# Patient Record
Sex: FEMALE | Race: BLACK OR AFRICAN AMERICAN | NOT HISPANIC OR LATINO | Employment: OTHER | URBAN - METROPOLITAN AREA
[De-identification: names, ages, dates, MRNs, and addresses within clinical notes are randomized per-mention and may not be internally consistent; named-entity substitution may affect disease eponyms.]

---

## 2022-09-22 ENCOUNTER — IMMUNIZATION (OUTPATIENT)
Dept: FAMILY MEDICINE | Facility: CLINIC | Age: 87
End: 2022-09-22
Payer: MEDICAID

## 2022-09-22 DIAGNOSIS — Z23 NEED FOR VACCINATION: Primary | ICD-10-CM

## 2022-09-22 PROCEDURE — 0124A COVID-19, MRNA, LNP-S, BIVALENT BOOSTER, PF, 30 MCG/0.3 ML DOSE: ICD-10-PCS | Mod: CV19,S$GLB,, | Performed by: INTERNAL MEDICINE

## 2022-09-22 PROCEDURE — 91312 COVID-19, MRNA, LNP-S, BIVALENT BOOSTER, PF, 30 MCG/0.3 ML DOSE: ICD-10-PCS | Mod: S$GLB,,, | Performed by: INTERNAL MEDICINE

## 2022-09-22 PROCEDURE — 91312 COVID-19, MRNA, LNP-S, BIVALENT BOOSTER, PF, 30 MCG/0.3 ML DOSE: CPT | Mod: S$GLB,,, | Performed by: INTERNAL MEDICINE

## 2022-09-22 PROCEDURE — 0124A COVID-19, MRNA, LNP-S, BIVALENT BOOSTER, PF, 30 MCG/0.3 ML DOSE: CPT | Mod: CV19,S$GLB,, | Performed by: INTERNAL MEDICINE

## 2023-03-10 ENCOUNTER — HOSPITAL ENCOUNTER (INPATIENT)
Facility: HOSPITAL | Age: 88
LOS: 5 days | Discharge: HOME HEALTH | DRG: 280 | End: 2023-03-15
Attending: STUDENT IN AN ORGANIZED HEALTH CARE EDUCATION/TRAINING PROGRAM | Admitting: INTERNAL MEDICINE
Payer: MEDICARE

## 2023-03-10 DIAGNOSIS — I10 HYPERTENSION, UNSPECIFIED TYPE: Primary | ICD-10-CM

## 2023-03-10 DIAGNOSIS — R68.84 JAW PAIN: ICD-10-CM

## 2023-03-10 DIAGNOSIS — R07.9 CHEST PAIN: ICD-10-CM

## 2023-03-10 DIAGNOSIS — I21.4 NSTEMI (NON-ST ELEVATED MYOCARDIAL INFARCTION): ICD-10-CM

## 2023-03-10 LAB
ALBUMIN SERPL-MCNC: 4 G/DL (ref 3.4–4.8)
ALBUMIN/GLOB SERPL: 1.1 RATIO (ref 1.1–2)
ALP SERPL-CCNC: 51 UNIT/L (ref 40–150)
ALT SERPL-CCNC: 9 UNIT/L (ref 0–55)
AST SERPL-CCNC: 21 UNIT/L (ref 5–34)
BASOPHILS # BLD AUTO: 0.07 X10(3)/MCL (ref 0–0.2)
BASOPHILS NFR BLD AUTO: 1.1 %
BILIRUBIN DIRECT+TOT PNL SERPL-MCNC: 0.4 MG/DL
BNP BLD-MCNC: 695.8 PG/ML
BUN SERPL-MCNC: 20.1 MG/DL (ref 9.8–20.1)
CALCIUM SERPL-MCNC: 9.2 MG/DL (ref 8.4–10.2)
CHLORIDE SERPL-SCNC: 111 MMOL/L (ref 98–111)
CO2 SERPL-SCNC: 20 MMOL/L (ref 23–31)
CREAT SERPL-MCNC: 0.85 MG/DL (ref 0.55–1.02)
CRP SERPL-MCNC: 4.9 MG/L
EOSINOPHIL # BLD AUTO: 0.08 X10(3)/MCL (ref 0–0.9)
EOSINOPHIL NFR BLD AUTO: 1.2 %
ERYTHROCYTE [DISTWIDTH] IN BLOOD BY AUTOMATED COUNT: 16.4 % (ref 11.5–17)
ERYTHROCYTE [SEDIMENTATION RATE] IN BLOOD: 13 MM/HR (ref 0–20)
FERRITIN SERPL-MCNC: 5.75 NG/ML (ref 4.63–204)
GFR SERPLBLD CREATININE-BSD FMLA CKD-EPI: >60 MLS/MIN/1.73/M2
GLOBULIN SER-MCNC: 3.5 GM/DL (ref 2.4–3.5)
GLUCOSE SERPL-MCNC: 81 MG/DL (ref 75–121)
HCT VFR BLD AUTO: 26.8 % (ref 37–47)
HGB BLD-MCNC: 8.1 G/DL (ref 12–16)
IMM GRANULOCYTES # BLD AUTO: 0.01 X10(3)/MCL (ref 0–0.04)
IMM GRANULOCYTES NFR BLD AUTO: 0.2 %
IRON SATN MFR SERPL: 10 % (ref 20–50)
IRON SERPL-MCNC: 42 UG/DL (ref 50–170)
LYMPHOCYTES # BLD AUTO: 1.65 X10(3)/MCL (ref 0.6–4.6)
LYMPHOCYTES NFR BLD AUTO: 25 %
MCH RBC QN AUTO: 23.3 PG
MCHC RBC AUTO-ENTMCNC: 30.2 G/DL (ref 33–36)
MCV RBC AUTO: 77.2 FL (ref 80–94)
MONOCYTES # BLD AUTO: 0.84 X10(3)/MCL (ref 0.1–1.3)
MONOCYTES NFR BLD AUTO: 12.7 %
NEUTROPHILS # BLD AUTO: 3.95 X10(3)/MCL (ref 2.1–9.2)
NEUTROPHILS NFR BLD AUTO: 59.8 %
NRBC BLD AUTO-RTO: 0 %
PLATELET # BLD AUTO: 322 X10(3)/MCL (ref 130–400)
PMV BLD AUTO: 10.4 FL (ref 7.4–10.4)
POTASSIUM SERPL-SCNC: 4 MMOL/L (ref 3.5–5.1)
PROT SERPL-MCNC: 7.5 GM/DL (ref 5.8–7.6)
RBC # BLD AUTO: 3.47 X10(6)/MCL (ref 4.2–5.4)
SODIUM SERPL-SCNC: 141 MMOL/L (ref 132–146)
TIBC SERPL-MCNC: 361 UG/DL (ref 70–310)
TIBC SERPL-MCNC: 403 UG/DL (ref 250–450)
TRANSFERRIN SERPL-MCNC: 365 MG/DL
TROPONIN I SERPL-MCNC: 0.04 NG/ML (ref 0–0.04)
TROPONIN I SERPL-MCNC: 0.05 NG/ML (ref 0–0.04)
TROPONIN I SERPL-MCNC: 0.05 NG/ML (ref 0–0.04)
VIT B12 SERPL-MCNC: 201 PG/ML (ref 213–816)
WBC # SPEC AUTO: 6.6 X10(3)/MCL (ref 4.5–11.5)

## 2023-03-10 PROCEDURE — 63600175 PHARM REV CODE 636 W HCPCS

## 2023-03-10 PROCEDURE — 84484 ASSAY OF TROPONIN QUANT: CPT | Performed by: PHYSICIAN ASSISTANT

## 2023-03-10 PROCEDURE — 85651 RBC SED RATE NONAUTOMATED: CPT | Performed by: INTERNAL MEDICINE

## 2023-03-10 PROCEDURE — 25000003 PHARM REV CODE 250: Performed by: STUDENT IN AN ORGANIZED HEALTH CARE EDUCATION/TRAINING PROGRAM

## 2023-03-10 PROCEDURE — 85025 COMPLETE CBC W/AUTO DIFF WBC: CPT | Performed by: NURSE PRACTITIONER

## 2023-03-10 PROCEDURE — 82607 VITAMIN B-12: CPT | Performed by: INTERNAL MEDICINE

## 2023-03-10 PROCEDURE — 93010 EKG 12-LEAD: ICD-10-PCS | Mod: 76,,, | Performed by: INTERNAL MEDICINE

## 2023-03-10 PROCEDURE — 25000003 PHARM REV CODE 250: Performed by: INTERNAL MEDICINE

## 2023-03-10 PROCEDURE — 83880 ASSAY OF NATRIURETIC PEPTIDE: CPT | Performed by: INTERNAL MEDICINE

## 2023-03-10 PROCEDURE — 96375 TX/PRO/DX INJ NEW DRUG ADDON: CPT

## 2023-03-10 PROCEDURE — 99285 EMERGENCY DEPT VISIT HI MDM: CPT | Mod: 25

## 2023-03-10 PROCEDURE — 93005 ELECTROCARDIOGRAM TRACING: CPT

## 2023-03-10 PROCEDURE — 83550 IRON BINDING TEST: CPT | Performed by: INTERNAL MEDICINE

## 2023-03-10 PROCEDURE — 11000001 HC ACUTE MED/SURG PRIVATE ROOM

## 2023-03-10 PROCEDURE — 96374 THER/PROPH/DIAG INJ IV PUSH: CPT

## 2023-03-10 PROCEDURE — 82728 ASSAY OF FERRITIN: CPT | Performed by: INTERNAL MEDICINE

## 2023-03-10 PROCEDURE — 80053 COMPREHEN METABOLIC PANEL: CPT | Performed by: NURSE PRACTITIONER

## 2023-03-10 PROCEDURE — 84484 ASSAY OF TROPONIN QUANT: CPT | Performed by: NURSE PRACTITIONER

## 2023-03-10 PROCEDURE — 21400001 HC TELEMETRY ROOM

## 2023-03-10 PROCEDURE — 93010 ELECTROCARDIOGRAM REPORT: CPT | Mod: ,,, | Performed by: INTERNAL MEDICINE

## 2023-03-10 PROCEDURE — 25000242 PHARM REV CODE 250 ALT 637 W/ HCPCS: Performed by: STUDENT IN AN ORGANIZED HEALTH CARE EDUCATION/TRAINING PROGRAM

## 2023-03-10 PROCEDURE — 93010 ELECTROCARDIOGRAM REPORT: CPT | Mod: 76,,, | Performed by: INTERNAL MEDICINE

## 2023-03-10 PROCEDURE — 63600175 PHARM REV CODE 636 W HCPCS: Performed by: INTERNAL MEDICINE

## 2023-03-10 PROCEDURE — 86140 C-REACTIVE PROTEIN: CPT | Performed by: INTERNAL MEDICINE

## 2023-03-10 RX ORDER — TRAMADOL HYDROCHLORIDE 50 MG/1
50 TABLET ORAL EVERY 6 HOURS PRN
Status: DISCONTINUED | OUTPATIENT
Start: 2023-03-10 | End: 2023-03-15 | Stop reason: HOSPADM

## 2023-03-10 RX ORDER — ONDANSETRON 2 MG/ML
4 INJECTION INTRAMUSCULAR; INTRAVENOUS EVERY 4 HOURS PRN
Status: DISCONTINUED | OUTPATIENT
Start: 2023-03-10 | End: 2023-03-15 | Stop reason: HOSPADM

## 2023-03-10 RX ORDER — PROCHLORPERAZINE EDISYLATE 5 MG/ML
5 INJECTION INTRAMUSCULAR; INTRAVENOUS EVERY 6 HOURS PRN
Status: DISCONTINUED | OUTPATIENT
Start: 2023-03-10 | End: 2023-03-15 | Stop reason: HOSPADM

## 2023-03-10 RX ORDER — HYDRALAZINE HYDROCHLORIDE 20 MG/ML
INJECTION INTRAMUSCULAR; INTRAVENOUS
Status: COMPLETED
Start: 2023-03-10 | End: 2023-03-10

## 2023-03-10 RX ORDER — HYDROCHLOROTHIAZIDE 25 MG/1
25 TABLET ORAL
Status: ON HOLD | COMMUNITY
Start: 2023-02-16 | End: 2023-03-15 | Stop reason: HOSPADM

## 2023-03-10 RX ORDER — NITROGLYCERIN 0.4 MG/1
0.4 TABLET SUBLINGUAL
Status: COMPLETED | OUTPATIENT
Start: 2023-03-10 | End: 2023-03-10

## 2023-03-10 RX ORDER — ACETAMINOPHEN 500 MG
1000 TABLET ORAL EVERY 6 HOURS PRN
Status: DISCONTINUED | OUTPATIENT
Start: 2023-03-10 | End: 2023-03-15 | Stop reason: HOSPADM

## 2023-03-10 RX ORDER — MAG HYDROX/ALUMINUM HYD/SIMETH 200-200-20
30 SUSPENSION, ORAL (FINAL DOSE FORM) ORAL 4 TIMES DAILY PRN
Status: DISCONTINUED | OUTPATIENT
Start: 2023-03-10 | End: 2023-03-15 | Stop reason: HOSPADM

## 2023-03-10 RX ORDER — HYDRALAZINE HYDROCHLORIDE 20 MG/ML
10 INJECTION INTRAMUSCULAR; INTRAVENOUS
Status: COMPLETED | OUTPATIENT
Start: 2023-03-10 | End: 2023-03-10

## 2023-03-10 RX ORDER — TALC
6 POWDER (GRAM) TOPICAL NIGHTLY PRN
Status: DISCONTINUED | OUTPATIENT
Start: 2023-03-10 | End: 2023-03-15 | Stop reason: HOSPADM

## 2023-03-10 RX ORDER — ENOXAPARIN SODIUM 100 MG/ML
1 INJECTION SUBCUTANEOUS ONCE
Status: COMPLETED | OUTPATIENT
Start: 2023-03-10 | End: 2023-03-11

## 2023-03-10 RX ORDER — LABETALOL HYDROCHLORIDE 5 MG/ML
10 INJECTION, SOLUTION INTRAVENOUS EVERY 4 HOURS PRN
Status: DISCONTINUED | OUTPATIENT
Start: 2023-03-10 | End: 2023-03-15 | Stop reason: HOSPADM

## 2023-03-10 RX ORDER — ENOXAPARIN SODIUM 100 MG/ML
40 INJECTION SUBCUTANEOUS EVERY 24 HOURS
Status: DISCONTINUED | OUTPATIENT
Start: 2023-03-11 | End: 2023-03-10

## 2023-03-10 RX ORDER — NITROGLYCERIN 0.4 MG/1
0.4 TABLET SUBLINGUAL EVERY 5 MIN PRN
Status: DISCONTINUED | OUTPATIENT
Start: 2023-03-10 | End: 2023-03-15 | Stop reason: HOSPADM

## 2023-03-10 RX ORDER — SODIUM CHLORIDE 0.9 % (FLUSH) 0.9 %
10 SYRINGE (ML) INJECTION
Status: DISCONTINUED | OUTPATIENT
Start: 2023-03-10 | End: 2023-03-15 | Stop reason: HOSPADM

## 2023-03-10 RX ORDER — ACETAMINOPHEN 325 MG/1
650 TABLET ORAL EVERY 4 HOURS PRN
Status: DISCONTINUED | OUTPATIENT
Start: 2023-03-10 | End: 2023-03-15 | Stop reason: HOSPADM

## 2023-03-10 RX ORDER — AMOXICILLIN 250 MG
2 CAPSULE ORAL 2 TIMES DAILY PRN
Status: DISCONTINUED | OUTPATIENT
Start: 2023-03-10 | End: 2023-03-15 | Stop reason: HOSPADM

## 2023-03-10 RX ORDER — HYDRALAZINE HYDROCHLORIDE 20 MG/ML
20 INJECTION INTRAMUSCULAR; INTRAVENOUS EVERY 4 HOURS PRN
Status: DISCONTINUED | OUTPATIENT
Start: 2023-03-10 | End: 2023-03-15 | Stop reason: HOSPADM

## 2023-03-10 RX ORDER — VERAPAMIL HYDROCHLORIDE 240 MG/1
240 TABLET, FILM COATED, EXTENDED RELEASE ORAL
COMMUNITY
Start: 2023-03-02

## 2023-03-10 RX ORDER — CLONIDINE HYDROCHLORIDE 0.2 MG/1
0.2 TABLET ORAL 3 TIMES DAILY PRN
Status: DISCONTINUED | OUTPATIENT
Start: 2023-03-10 | End: 2023-03-15 | Stop reason: HOSPADM

## 2023-03-10 RX ORDER — MORPHINE SULFATE 4 MG/ML
1 INJECTION, SOLUTION INTRAMUSCULAR; INTRAVENOUS
Status: COMPLETED | OUTPATIENT
Start: 2023-03-10 | End: 2023-03-10

## 2023-03-10 RX ORDER — ASPIRIN 81 MG/1
81 TABLET ORAL DAILY
Status: DISCONTINUED | OUTPATIENT
Start: 2023-03-11 | End: 2023-03-15 | Stop reason: HOSPADM

## 2023-03-10 RX ORDER — SODIUM CHLORIDE 0.9 % (FLUSH) 0.9 %
10 SYRINGE (ML) INJECTION
Status: DISCONTINUED | OUTPATIENT
Start: 2023-03-10 | End: 2023-03-10

## 2023-03-10 RX ORDER — ASPIRIN 325 MG
325 TABLET, DELAYED RELEASE (ENTERIC COATED) ORAL
Status: COMPLETED | OUTPATIENT
Start: 2023-03-10 | End: 2023-03-10

## 2023-03-10 RX ORDER — SIMETHICONE 80 MG
1 TABLET,CHEWABLE ORAL 4 TIMES DAILY PRN
Status: DISCONTINUED | OUTPATIENT
Start: 2023-03-10 | End: 2023-03-15 | Stop reason: HOSPADM

## 2023-03-10 RX ORDER — PANTOPRAZOLE SODIUM 40 MG/1
40 TABLET, DELAYED RELEASE ORAL DAILY
Status: DISCONTINUED | OUTPATIENT
Start: 2023-03-11 | End: 2023-03-15 | Stop reason: HOSPADM

## 2023-03-10 RX ORDER — NAPROXEN SODIUM 220 MG/1
81 TABLET, FILM COATED ORAL DAILY
COMMUNITY

## 2023-03-10 RX ORDER — METOPROLOL SUCCINATE 25 MG/1
25 TABLET, EXTENDED RELEASE ORAL DAILY
Status: DISCONTINUED | OUTPATIENT
Start: 2023-03-10 | End: 2023-03-11

## 2023-03-10 RX ORDER — LANSOPRAZOLE 30 MG/1
30 CAPSULE, DELAYED RELEASE ORAL
COMMUNITY
Start: 2023-02-24

## 2023-03-10 RX ORDER — POLYETHYLENE GLYCOL 3350 17 G/17G
17 POWDER, FOR SOLUTION ORAL 2 TIMES DAILY PRN
Status: DISCONTINUED | OUTPATIENT
Start: 2023-03-10 | End: 2023-03-15 | Stop reason: HOSPADM

## 2023-03-10 RX ADMIN — METOPROLOL SUCCINATE 25 MG: 25 TABLET, EXTENDED RELEASE ORAL at 10:03

## 2023-03-10 RX ADMIN — NITROGLYCERIN 0.4 MG: 0.4 TABLET, ORALLY DISINTEGRATING SUBLINGUAL at 04:03

## 2023-03-10 RX ADMIN — ASPIRIN 325 MG: 325 TABLET, COATED ORAL at 04:03

## 2023-03-10 RX ADMIN — MORPHINE SULFATE 1 MG: 4 INJECTION INTRAVENOUS at 08:03

## 2023-03-10 RX ADMIN — HYDRALAZINE HYDROCHLORIDE 10 MG: 20 INJECTION INTRAMUSCULAR; INTRAVENOUS at 05:03

## 2023-03-10 RX ADMIN — TRAMADOL HYDROCHLORIDE 50 MG: 50 TABLET, COATED ORAL at 11:03

## 2023-03-10 RX ADMIN — Medication 6 MG: at 11:03

## 2023-03-10 RX ADMIN — HYDRALAZINE HYDROCHLORIDE 10 MG: 20 INJECTION INTRAMUSCULAR; INTRAVENOUS at 08:03

## 2023-03-10 NOTE — ED PROVIDER NOTES
"Encounter Date: 3/10/2023    SCRIBE #1 NOTE: I, Brunojorge Martinez, am scribing for, and in the presence of,  Zheng Singh MD. I have scribed the following portions of the note - the EKG reading. Other sections scribed: hpi, ros, pe.     History     Chief Complaint   Patient presents with    Arm Pain     C/O L side arm pain and jaw pain, worse at night. Intermittent for "a while." Reports starts at night and usually gone by morning.  Denies pain at this time. Denies injury /fall.     89 y/o female with history of HTN presenting to the ED complaining of chest pain onset yesterday. Patient states pain radiates to left arm and left jaw; states jaw and arm pain is consistent at night but resolves in the morning. Patient's daughter states jaw and arm pain has been intermittent for the last several months but only occurs during the night. Patient denies recent fall or leg swelling.     The history is provided by the patient and a relative (Daughter). No  was used.   Chest Pain  The current episode started yesterday. Duration of episode(s) is 1 day. Chest pain occurs constantly. The quality of the pain is described as aching and crushing. The pain radiates to the upper back, left jaw and left arm. Pertinent negatives for primary symptoms include no fever, no shortness of breath and no abdominal pain.   Pertinent negatives for associated symptoms include no weakness.         Review of patient's allergies indicates:  No Known Allergies  History reviewed. No pertinent past medical history.  Past Surgical History:   Procedure Laterality Date    EGD, WITH CLOSED BIOPSY  3/13/2023    Procedure: EGD, WITH CLOSED BIOPSY;  Surgeon: Anuel Calix MD;  Location: [facility] ENDOSCOPY;  Service: Gastroenterology;;    ESOPHAGOGASTRODUODENOSCOPY N/A 3/13/2023    Procedure: EGD;  Surgeon: Anuel Calix MD;  Location: [facility] ENDOSCOPY;  Service: Gastroenterology;  Laterality: N/A;     History reviewed. No " pertinent family history.     Review of Systems   Constitutional:  Negative for fever.   HENT:  Negative for sore throat.         Left jaw pain   Eyes:  Negative for visual disturbance.   Respiratory:  Negative for shortness of breath.    Cardiovascular:  Positive for chest pain.   Gastrointestinal:  Negative for abdominal pain.   Genitourinary:  Negative for dysuria.   Musculoskeletal:  Positive for back pain. Negative for joint swelling.        Left arm pain   Skin:  Negative for rash.   Neurological:  Negative for weakness.   Psychiatric/Behavioral:  Negative for confusion.      Physical Exam     Initial Vitals [03/10/23 1207]   BP Pulse Resp Temp SpO2   (!) 170/67 85 17 98.4 °F (36.9 °C) 99 %      MAP       --         Physical Exam    Nursing note and vitals reviewed.  Constitutional: She appears well-developed and well-nourished. She is not diaphoretic. No distress.   HENT:   Head: Normocephalic and atraumatic.   Eyes: Conjunctivae and EOM are normal. Pupils are equal, round, and reactive to light.   Neck:   Normal range of motion.  Cardiovascular:  Normal rate, regular rhythm, normal heart sounds and intact distal pulses.           No murmur heard.  Pulmonary/Chest: Breath sounds normal. No respiratory distress. She has no wheezes. She has no rales.   Abdominal: Abdomen is soft. She exhibits no distension. There is no abdominal tenderness. There is no rebound.   Musculoskeletal:         General: No tenderness or edema. Normal range of motion.      Cervical back: Normal range of motion.     Neurological: She is alert and oriented to person, place, and time. She has normal strength. No cranial nerve deficit. GCS score is 15. GCS eye subscore is 4. GCS verbal subscore is 5. GCS motor subscore is 6.   Skin: Skin is warm and dry. Capillary refill takes less than 2 seconds. No rash noted. No erythema.   Psychiatric: She has a normal mood and affect.       ED Course   Procedures  Labs Reviewed   CBC WITH DIFFERENTIAL  - Abnormal; Notable for the following components:       Result Value    RBC 3.47 (*)     Hgb 8.1 (*)     Hct 26.8 (*)     MCV 77.2 (*)     MCHC 30.2 (*)     All other components within normal limits   COMPREHENSIVE METABOLIC PANEL - Abnormal; Notable for the following components:    Carbon Dioxide 20 (*)     All other components within normal limits   TROPONIN I - Abnormal; Notable for the following components:    Troponin-I 0.054 (*)     All other components within normal limits   TROPONIN I - Abnormal; Notable for the following components:    Troponin-I 0.047 (*)     All other components within normal limits   IRON AND TIBC - Abnormal; Notable for the following components:    Iron Binding Capacity Unsaturated 361 (*)     Iron Level 42 (*)     Iron Saturation 10 (*)     All other components within normal limits   B-TYPE NATRIURETIC PEPTIDE - Abnormal; Notable for the following components:    Natriuretic Peptide 695.8 (*)     All other components within normal limits   TROPONIN I - Normal   SEDIMENTATION RATE - Normal   C-REACTIVE PROTEIN - Normal     EKG Readings: (Independently Interpreted)   Initial Reading: No STEMI. Rhythm: Normal Sinus Rhythm. Heart Rate: 84. Axis: Left Axis Deviation.   1212  Low voltage QRS    ECG Results              EKG 12-lead (Final result)  Result time 03/10/23 21:13:31      Final result by Interface, Lab [facility] (03/10/23 21:13:31)                   Narrative:    Test Reason : R07.9,    Vent. Rate : 085 BPM     Atrial Rate : 085 BPM     P-R Int : 146 ms          QRS Dur : 122 ms      QT Int : 446 ms       P-R-T Axes : 062 -36 087 degrees     QTc Int : 530 ms    Normal sinus rhythm  Left axis deviation  Left bundle branch block  Abnormal ECG  Confirmed by Winston Aparicio MD [id number]) on 3/10/2023 9:13:18 PM    Referred By: [email or web address]   SELF           Confirmed By:Winston Aparicio MD                                     EKG 12-lead (Final result)  Result time 03/10/23 21:13:07      Final  result by Interface, Lab In [facility] (03/10/23 21:13:07)                   Narrative:    Test Reason : R68.84,    Vent. Rate : 084 BPM     Atrial Rate : 084 BPM     P-R Int : 134 ms          QRS Dur : 120 ms      QT Int : 418 ms       P-R-T Axes : 066 -56 085 degrees     QTc Int : 493 ms    Normal sinus rhythm  Left axis deviation  Low voltage QRS  LAFB  Abnormal R wave progression in the precordial leads    Abnormal ECG  No previous ECGs available  Confirmed by Winston Aparicio MD [id number]) on 3/10/2023 9:12:58 PM    Referred By: VENESSA   SELF           Confirmed By:Winston Aparicio MD                                     EKG 12-LEAD (Final result)  Result time 03/22/23 14:37:53      Final result by Unknown User (03/22/23 14:37:53)                                      Imaging Results              X-Ray Chest PA And Lateral (Final result)  Result time 03/10/23 20:45:27      Final result by Mateo Grover MD (03/10/23 20:45:27)                   Impression:      Cardiomegaly without acute cardiopulmonary abnormality.      Electronically signed by: Mateo Grover MD  Date:    03/10/2023  Time:    20:45               Narrative:    EXAMINATION:  Two view chest radiograph.    CLINICAL HISTORY:  Chest Pain;    TECHNIQUE:  Two view of the chest.    COMPARISON:  Chest radiograph 03/10/2023.    FINDINGS:  The lungs are clear without consolidation or effusion.  There is no pneumothorax.  The cardiac silhouette is enlarged.  There is no acute osseous abnormality.                                       X-Ray Chest AP Portable (Final result)  Result time 03/10/23 17:07:26      Final result by Junior Taveras MD (03/10/23 17:07:26)                   Impression:      NO ACUTE CARDIOPULMONARY PROCESS IDENTIFIED.      Electronically signed by: Junior Taveras  Date:    03/10/2023  Time:    17:07               Narrative:    EXAMINATION:  XR CHEST AP PORTABLE    CLINICAL HISTORY:  Chest pain, unspecified    TECHNIQUE:  One view.    COMPARISON:  None  available.    FINDINGS:  Cardiopericardial silhouette is within normal limits.  No acute dense focal or segmental consolidation, congestive process, pleural effusions or pneumothorax.                                    X-Rays:   Independently Interpreted Readings:   Chest X-Ray: Normal heart size.  No infiltrates.  No acute abnormalities. Medical Decision Making  Problems Addressed:  Chest pain: acute illness or injury that poses a threat to life or bodily functions  NSTEMI (non-ST elevated myocardial infarction): acute illness or injury that poses a threat to life or bodily functions    Amount and/or Complexity of Data Reviewed  External Data Reviewed: labs and radiology.  Labs: ordered. Decision-making details documented in ED Course.  Radiology: ordered and independent interpretation performed.  ECG/medicine tests: ordered and independent interpretation performed.    Risk  Parenteral controlled substances.  Decision regarding hospitalization.       Medications   nitroGLYCERIN SL tablet 0.4 mg (0.4 mg Sublingual Given 3/10/23 1632)   aspirin EC tablet 325 mg (325 mg Oral Given 3/10/23 1632)   hydrALAZINE (APRESOLINE) 20 mg/mL injection (10 mg  Given 3/10/23 1745)   morphine injection 1 mg (1 mg Intravenous Given 3/10/23 2030)   hydrALAZINE injection 10 mg (10 mg Intravenous Given 3/10/23 2030)   enoxaparin injection 50 mg (50 mg Subcutaneous Given 3/11/23 0012)   furosemide injection 20 mg (20 mg Intravenous Given 3/11/23 1539)   furosemide injection 40 mg (40 mg Intravenous Given 3/11/23 2312)   potassium bicarbonate disintegrating tablet 50 mEq (50 mEq Oral Given 3/12/23 2218)   magnesium sulfate in dextrose IVPB (premix) 1 g (0 g Intravenous Stopped 3/12/23 2319)   propofol (DIPRIVAN) 10 mg/mL IVP injection (  Override pull for Anesthesia 3/13/23 0946)   LIDOcaine (PF) 10 mg/ml (1%) 10 mg/mL (1 %) injection (  Override pull for Anesthesia 3/13/23 0946)     Medical Decision Making:   History:   I obtained  history from: someone other than patient.       <> Summary of History: Patient's daughter states jaw and arm pain has been intermittent for the last several months but only occurs during the night.  Old Medical Records: I decided to obtain old medical records.  Old Records Summarized: records from clinic visits and records from previous admission(s).  Initial Assessment:   Chest pain  Differential Diagnosis:   Judging by the patient's chief complaint and pertinent history, the patient has the following possible differential diagnoses, including but not limited to the following.  Some of these are deemed to be lower likelihood and some more likely based on my physical exam and history combined with possible lab work and/or imaging studies.   Please see the pertinent studies, and refer to the HPI.  Some of these diagnoses will take further evaluation to fully rule out, perhaps as an outpatient and the patient was encouraged to follow up when discharged for more comprehensive evaluation.    Chest pain emergent diagnoses: ACS, PE, dissection, cardiac tamponade, tension pneumothorax.    Chest pain non-emergent diagnoses: Musculoskeletal, trauma, pleurisy, pneumonia, pleural effusion, GERD, other GI, neurologic, psychiatric and other non emergent diagnoses considered.        Independently Interpreted Test(s):   I have ordered and independently interpreted X-rays - see prior notes.  I have ordered and independently interpreted EKG Reading(s) - see prior notes  Clinical Tests:   Lab Tests: Ordered and Reviewed  Radiological Study: Reviewed and Ordered  Medical Tests: Reviewed and Ordered  ED Management:  Patient is a 89-year-old female who presents to emergency department for chest pain that radiates to left arm left upper back, left jaw.  See HPI.  See physical exam.  EKG without ST elevation.  Patient given nitroglycerin with improvement in symptoms.  She has received an aspirin.  Patient given dose of Lovenox for  elevated troponin.  Discussed with cardiology.  Discussed with medicine for admission.  All results discussed with the patient and family.  Answered all questions at this time.  Verbalized understanding agreed to plan.  Other:   I have discussed this case with another health care provider.           ED Course as of 04/07/23 1410   Fri Mar 10, 2023   1632 Spoke with cardiologist; told to admit patient to hospitalist [MS]   1740 Spoke with hospitalist; will admit patient  [MS]      ED Course User Index  [MS] Nehal Martinez                   Clinical Impression:   Final diagnoses:  [R68.84] Jaw pain  [R07.9] Chest pain  [I21.4] NSTEMI (non-ST elevated myocardial infarction)        ED Disposition Condition    Admit                 Zheng Singh MD  04/07/23 1412

## 2023-03-10 NOTE — FIRST PROVIDER EVALUATION
Medical screening examination initiated.  I have conducted a focused provider triage encounter, findings are as follows:    Brief history of present illness:  89 y/o female who presents with left jaw pain and left arm pain that usually is intermittent at night only but today she woke up with this pain as well. No jaw pain currently but does still have a little left arm pain.     There were no vitals filed for this visit.    Pertinent physical exam:  alert, nonlabored, ambulatory with assistance.     Brief workup plan:  ekg, labs    Preliminary workup initiated; this workup will be continued and followed by the physician or advanced practice provider that is assigned to the patient when roomed.

## 2023-03-10 NOTE — Clinical Note
Diagnosis: NSTEMI (non-ST elevated myocardial infarction) [695464]   Admitting Provider:: TOMY JULIAN [[id number]]   Future Attending Provider: TOMY JULIAN [[id number]]   Reason for IP Medical Treatment  (Clinical interventions that can only be accomplished in the IP setting? ) :: NSTEMI, Elevated trop, Hypertensive emergency   I certify that Inpatient services for greater than or equal to 2 midnights are medically necessary:: Yes   Plans for Post-Acute care--if anticipated (pick the single best option):: A. No post acute care anticipated at this time

## 2023-03-11 LAB
ABORH RETYPE: NORMAL
ALBUMIN SERPL-MCNC: 3.7 G/DL (ref 3.4–4.8)
ALBUMIN/GLOB SERPL: 1.2 RATIO (ref 1.1–2)
ALP SERPL-CCNC: 46 UNIT/L (ref 40–150)
ALT SERPL-CCNC: 11 UNIT/L (ref 0–55)
AORTIC VALVE CUSP SEPERATION: 1.8 CM
AST SERPL-CCNC: 34 UNIT/L (ref 5–34)
AV INDEX (PROSTH): 0.74
AV MEAN GRADIENT: 4 MMHG
AV PEAK GRADIENT: 8 MMHG
AV VALVE AREA: 1.89 CM2
AV VELOCITY RATIO: 0.8
BASOPHILS # BLD AUTO: 0.06 X10(3)/MCL (ref 0–0.2)
BASOPHILS NFR BLD AUTO: 0.7 %
BILIRUBIN DIRECT+TOT PNL SERPL-MCNC: 0.7 MG/DL
BSA FOR ECHO PROCEDURE: 1.51 M2
BUN SERPL-MCNC: 15.2 MG/DL (ref 9.8–20.1)
CALCIUM SERPL-MCNC: 9 MG/DL (ref 8.4–10.2)
CHLORIDE SERPL-SCNC: 110 MMOL/L (ref 98–111)
CHOLEST SERPL-MCNC: 143 MG/DL
CHOLEST/HDLC SERPL: 3 {RATIO} (ref 0–5)
CO2 SERPL-SCNC: 19 MMOL/L (ref 23–31)
CREAT SERPL-MCNC: 0.73 MG/DL (ref 0.55–1.02)
CV ECHO LV RWT: 0.46 CM
DOP CALC AO PEAK VEL: 1.38 M/S
DOP CALC AO VTI: 23.9 CM
DOP CALC LVOT AREA: 2.5 CM2
DOP CALC LVOT DIAMETER: 1.8 CM
DOP CALC LVOT PEAK VEL: 1.1 M/S
DOP CALC LVOT STROKE VOLUME: 45.27 CM3
DOP CALC MV VTI: 17.3 CM
DOP CALCLVOT PEAK VEL VTI: 17.8 CM
E WAVE DECELERATION TIME: 143 MSEC
E/A RATIO: 0.61
E/E' RATIO: 10.27 M/S
ECHO LV POSTERIOR WALL: 1.06 CM (ref 0.6–1.1)
EJECTION FRACTION: 40 %
EOSINOPHIL # BLD AUTO: 0.03 X10(3)/MCL (ref 0–0.9)
EOSINOPHIL NFR BLD AUTO: 0.4 %
ERYTHROCYTE [DISTWIDTH] IN BLOOD BY AUTOMATED COUNT: 16.7 % (ref 11.5–17)
EST. AVERAGE GLUCOSE BLD GHB EST-MCNC: 134.1 MG/DL
FOLATE SERPL-MCNC: 14.1 NG/ML (ref 7–31.4)
FRACTIONAL SHORTENING: 31 % (ref 28–44)
GFR SERPLBLD CREATININE-BSD FMLA CKD-EPI: >60 MLS/MIN/1.73/M2
GLOBULIN SER-MCNC: 3.1 GM/DL (ref 2.4–3.5)
GLUCOSE SERPL-MCNC: 93 MG/DL (ref 75–121)
GROUP & RH: NORMAL
HBA1C MFR BLD: 6.3 %
HCT VFR BLD AUTO: 25.2 % (ref 37–47)
HDLC SERPL-MCNC: 52 MG/DL (ref 35–60)
HGB BLD-MCNC: 7.6 G/DL (ref 12–16)
IMM GRANULOCYTES # BLD AUTO: 0.03 X10(3)/MCL (ref 0–0.04)
IMM GRANULOCYTES NFR BLD AUTO: 0.4 %
INDIRECT COOMBS GEL: NORMAL
INTERVENTRICULAR SEPTUM: 1.04 CM (ref 0.6–1.1)
LDLC SERPL CALC-MCNC: 72 MG/DL (ref 50–140)
LEFT ATRIUM SIZE: 3.2 CM
LEFT ATRIUM VOLUME INDEX MOD: 35.9 ML/M2
LEFT ATRIUM VOLUME MOD: 53.8 CM3
LEFT INTERNAL DIMENSION IN SYSTOLE: 3.16 CM (ref 2.1–4)
LEFT VENTRICLE DIASTOLIC VOLUME INDEX: 63.93 ML/M2
LEFT VENTRICLE DIASTOLIC VOLUME: 95.9 ML
LEFT VENTRICLE MASS INDEX: 112 G/M2
LEFT VENTRICLE SYSTOLIC VOLUME INDEX: 26.5 ML/M2
LEFT VENTRICLE SYSTOLIC VOLUME: 39.7 ML
LEFT VENTRICULAR INTERNAL DIMENSION IN DIASTOLE: 4.57 CM (ref 3.5–6)
LEFT VENTRICULAR MASS: 168.08 G
LV LATERAL E/E' RATIO: 9.63 M/S
LV SEPTAL E/E' RATIO: 11 M/S
LVOT MG: 2 MMHG
LVOT MV: 0.66 CM/S
LYMPHOCYTES # BLD AUTO: 1.17 X10(3)/MCL (ref 0.6–4.6)
LYMPHOCYTES NFR BLD AUTO: 14.5 %
MAGNESIUM SERPL-MCNC: 2.1 MG/DL (ref 1.6–2.6)
MCH RBC QN AUTO: 23.3 PG
MCHC RBC AUTO-ENTMCNC: 30.2 G/DL (ref 33–36)
MCV RBC AUTO: 77.3 FL (ref 80–94)
MONOCYTES # BLD AUTO: 0.72 X10(3)/MCL (ref 0.1–1.3)
MONOCYTES NFR BLD AUTO: 8.9 %
MV MEAN GRADIENT: 3 MMHG
MV PEAK A VEL: 1.26 M/S
MV PEAK E VEL: 0.77 M/S
MV PEAK GRADIENT: 6 MMHG
MV VALVE AREA BY CONTINUITY EQUATION: 2.62 CM2
NEUTROPHILS # BLD AUTO: 6.04 X10(3)/MCL (ref 2.1–9.2)
NEUTROPHILS NFR BLD AUTO: 75.1 %
NRBC BLD AUTO-RTO: 0 %
PHOSPHATE SERPL-MCNC: 3.7 MG/DL (ref 2.3–4.7)
PISA TR MAX VEL: 3.7 M/S
PLATELET # BLD AUTO: 260 X10(3)/MCL (ref 130–400)
PMV BLD AUTO: 10.9 FL (ref 7.4–10.4)
POTASSIUM SERPL-SCNC: 4.3 MMOL/L (ref 3.5–5.1)
PROT SERPL-MCNC: 6.8 GM/DL (ref 5.8–7.6)
RA MAJOR: 5.03 CM
RA PRESSURE: 8 MMHG
RA WIDTH: 4.06 CM
RBC # BLD AUTO: 3.26 X10(6)/MCL (ref 4.2–5.4)
RIGHT VENTRICULAR END-DIASTOLIC DIMENSION: 3.36 CM
SODIUM SERPL-SCNC: 141 MMOL/L (ref 132–146)
TDI LATERAL: 0.08 M/S
TDI SEPTAL: 0.07 M/S
TDI: 0.08 M/S
TR MAX PG: 55 MMHG
TRICUSPID ANNULAR PLANE SYSTOLIC EXCURSION: 1.84 CM
TRIGL SERPL-MCNC: 97 MG/DL (ref 37–140)
TROPONIN I SERPL-MCNC: 0.06 NG/ML (ref 0–0.04)
TROPONIN I SERPL-MCNC: 0.07 NG/ML (ref 0–0.04)
TROPONIN I SERPL-MCNC: 0.09 NG/ML (ref 0–0.04)
TV REST PULMONARY ARTERY PRESSURE: 63 MMHG
VLDLC SERPL CALC-MCNC: 19 MG/DL
WBC # SPEC AUTO: 8.1 X10(3)/MCL (ref 4.5–11.5)

## 2023-03-11 PROCEDURE — 83036 HEMOGLOBIN GLYCOSYLATED A1C: CPT | Performed by: INTERNAL MEDICINE

## 2023-03-11 PROCEDURE — 84484 ASSAY OF TROPONIN QUANT: CPT | Performed by: INTERNAL MEDICINE

## 2023-03-11 PROCEDURE — 25000242 PHARM REV CODE 250 ALT 637 W/ HCPCS: Performed by: INTERNAL MEDICINE

## 2023-03-11 PROCEDURE — 63600175 PHARM REV CODE 636 W HCPCS: Performed by: STUDENT IN AN ORGANIZED HEALTH CARE EDUCATION/TRAINING PROGRAM

## 2023-03-11 PROCEDURE — 80053 COMPREHEN METABOLIC PANEL: CPT | Performed by: PHYSICIAN ASSISTANT

## 2023-03-11 PROCEDURE — 63600175 PHARM REV CODE 636 W HCPCS: Performed by: INTERNAL MEDICINE

## 2023-03-11 PROCEDURE — 83735 ASSAY OF MAGNESIUM: CPT | Performed by: NURSE PRACTITIONER

## 2023-03-11 PROCEDURE — 25000003 PHARM REV CODE 250: Performed by: NURSE PRACTITIONER

## 2023-03-11 PROCEDURE — 85025 COMPLETE CBC W/AUTO DIFF WBC: CPT | Performed by: PHYSICIAN ASSISTANT

## 2023-03-11 PROCEDURE — 82746 ASSAY OF FOLIC ACID SERUM: CPT | Performed by: INTERNAL MEDICINE

## 2023-03-11 PROCEDURE — 25000003 PHARM REV CODE 250: Performed by: INTERNAL MEDICINE

## 2023-03-11 PROCEDURE — 86923 COMPATIBILITY TEST ELECTRIC: CPT | Performed by: INTERNAL MEDICINE

## 2023-03-11 PROCEDURE — 84484 ASSAY OF TROPONIN QUANT: CPT | Performed by: PHYSICIAN ASSISTANT

## 2023-03-11 PROCEDURE — 21400001 HC TELEMETRY ROOM

## 2023-03-11 PROCEDURE — 86900 BLOOD TYPING SEROLOGIC ABO: CPT | Performed by: INTERNAL MEDICINE

## 2023-03-11 PROCEDURE — 80061 LIPID PANEL: CPT | Performed by: INTERNAL MEDICINE

## 2023-03-11 PROCEDURE — 84100 ASSAY OF PHOSPHORUS: CPT | Performed by: NURSE PRACTITIONER

## 2023-03-11 RX ORDER — FUROSEMIDE 10 MG/ML
40 INJECTION INTRAMUSCULAR; INTRAVENOUS ONCE
Status: COMPLETED | OUTPATIENT
Start: 2023-03-11 | End: 2023-03-11

## 2023-03-11 RX ORDER — ATORVASTATIN CALCIUM 40 MG/1
40 TABLET, FILM COATED ORAL NIGHTLY
Status: DISCONTINUED | OUTPATIENT
Start: 2023-03-11 | End: 2023-03-15 | Stop reason: HOSPADM

## 2023-03-11 RX ORDER — CYANOCOBALAMIN 1000 UG/ML
1000 INJECTION, SOLUTION INTRAMUSCULAR; SUBCUTANEOUS DAILY
Status: DISCONTINUED | OUTPATIENT
Start: 2023-03-11 | End: 2023-03-15 | Stop reason: HOSPADM

## 2023-03-11 RX ORDER — VERAPAMIL HYDROCHLORIDE 240 MG/1
240 TABLET, FILM COATED, EXTENDED RELEASE ORAL DAILY
Status: DISCONTINUED | OUTPATIENT
Start: 2023-03-11 | End: 2023-03-15 | Stop reason: HOSPADM

## 2023-03-11 RX ORDER — ISOSORBIDE MONONITRATE 30 MG/1
30 TABLET, EXTENDED RELEASE ORAL DAILY
Status: DISCONTINUED | OUTPATIENT
Start: 2023-03-11 | End: 2023-03-15 | Stop reason: HOSPADM

## 2023-03-11 RX ORDER — FUROSEMIDE 10 MG/ML
20 INJECTION INTRAMUSCULAR; INTRAVENOUS ONCE
Status: COMPLETED | OUTPATIENT
Start: 2023-03-11 | End: 2023-03-11

## 2023-03-11 RX ORDER — HYDROCODONE BITARTRATE AND ACETAMINOPHEN 500; 5 MG/1; MG/1
TABLET ORAL
Status: DISCONTINUED | OUTPATIENT
Start: 2023-03-11 | End: 2023-03-15 | Stop reason: HOSPADM

## 2023-03-11 RX ORDER — METOPROLOL SUCCINATE 50 MG/1
50 TABLET, EXTENDED RELEASE ORAL DAILY
Status: DISCONTINUED | OUTPATIENT
Start: 2023-03-11 | End: 2023-03-15 | Stop reason: HOSPADM

## 2023-03-11 RX ORDER — NITROGLYCERIN 120 MG/1
1 PATCH TRANSDERMAL ONCE
Status: DISCONTINUED | OUTPATIENT
Start: 2023-03-11 | End: 2023-03-11

## 2023-03-11 RX ORDER — FOLIC ACID 1 MG/1
1 TABLET ORAL DAILY
Status: DISCONTINUED | OUTPATIENT
Start: 2023-03-11 | End: 2023-03-15 | Stop reason: HOSPADM

## 2023-03-11 RX ADMIN — FUROSEMIDE 20 MG: 10 INJECTION, SOLUTION INTRAMUSCULAR; INTRAVENOUS at 03:03

## 2023-03-11 RX ADMIN — TRAMADOL HYDROCHLORIDE 50 MG: 50 TABLET, COATED ORAL at 09:03

## 2023-03-11 RX ADMIN — PANTOPRAZOLE SODIUM 40 MG: 40 TABLET, DELAYED RELEASE ORAL at 09:03

## 2023-03-11 RX ADMIN — SODIUM CHLORIDE 125 MG: 9 INJECTION, SOLUTION INTRAVENOUS at 11:03

## 2023-03-11 RX ADMIN — NITROGLYCERIN 0.4 MG: 0.4 TABLET, ORALLY DISINTEGRATING SUBLINGUAL at 12:03

## 2023-03-11 RX ADMIN — ISOSORBIDE MONONITRATE 30 MG: 30 TABLET, EXTENDED RELEASE ORAL at 10:03

## 2023-03-11 RX ADMIN — ENOXAPARIN SODIUM 50 MG: 80 INJECTION SUBCUTANEOUS at 12:03

## 2023-03-11 RX ADMIN — CYANOCOBALAMIN 1000 MCG: 1000 INJECTION, SOLUTION INTRAMUSCULAR; SUBCUTANEOUS at 11:03

## 2023-03-11 RX ADMIN — METOPROLOL SUCCINATE 50 MG: 50 TABLET, EXTENDED RELEASE ORAL at 09:03

## 2023-03-11 RX ADMIN — HYDRALAZINE HYDROCHLORIDE 20 MG: 20 INJECTION INTRAMUSCULAR; INTRAVENOUS at 12:03

## 2023-03-11 RX ADMIN — VERAPAMIL HYDROCHLORIDE 240 MG: 240 TABLET, FILM COATED, EXTENDED RELEASE ORAL at 09:03

## 2023-03-11 RX ADMIN — FOLIC ACID 1 MG: 1 TABLET ORAL at 11:03

## 2023-03-11 RX ADMIN — FUROSEMIDE 40 MG: 10 INJECTION, SOLUTION INTRAMUSCULAR; INTRAVENOUS at 11:03

## 2023-03-11 RX ADMIN — ATORVASTATIN CALCIUM 40 MG: 40 TABLET, FILM COATED ORAL at 11:03

## 2023-03-11 RX ADMIN — NITROGLYCERIN 1 PATCH: 0.6 PATCH TRANSDERMAL at 01:03

## 2023-03-11 RX ADMIN — ASPIRIN 81 MG: 81 TABLET, COATED ORAL at 09:03

## 2023-03-11 NOTE — CONSULTS
Inpatient consult to Cardiology  Consult performed by: BRIDGET Morton  Consult ordered by: Justus Oliva AGACNP-BC  Reason for consult: NSTEMI    [facility]  Cardiology  Consult Note    Patient Name: Simin Gudino  MRN: [id number]  Admission Date: 3/10/2023  Hospital Length of Stay: 1 days  Code Status: Full Code   Attending Provider: Nate Trujillo MD   Consulting Provider: BRIDGET Morton  Primary Care Physician: Se Patel MD  Principal Problem:NSTEMI (non-ST elevated myocardial infarction)    Patient information was obtained from patient and ER records.     Subjective:     Chief Complaint:  Consult for NSTEMI    HPI:   Ms. Gudino is a 89 year old, unknown to [facility], who presented to [facility] on 3.10.23 with c/o upper back pain radiating to the  left arm and jaw. Her pain was relieved with ntg. She has a history of HTN. Upon workup her troponin was mildly elevated at 0.062 and BNP was 695. EKG revealed nospecific ST abnormalities and left BBB. [facility] is being consulted for NSTEMI.      PMH: HTN, GERD  PSH: Negative  Family History: Noncontributory  Social History: No tobacco, alcohol or drug     Previous Cardiac Diagnostics:   No previous studies    History reviewed. No pertinent past medical history.    No past surgical history on file.    Review of patient's allergies indicates:  No Known Allergies    No current facility-administered medications on file prior to encounter.     Current Outpatient Medications on File Prior to Encounter   Medication Sig    lansoprazole (PREVACID) 30 MG capsule Take 30 mg by mouth.    verapamiL (CALAN-SR) 240 MG CR tablet Take 240 mg by mouth.    aspirin 81 MG Chew Take 81 mg by mouth once daily.    hydroCHLOROthiazide (HYDRODIURIL) 25 MG tablet Take 25 mg by mouth.     Family History    None       Tobacco Use    Smoking status: Not on file    Smokeless tobacco: Not on file   Substance and Sexual Activity    Alcohol use: Not on file     Drug use: Not on file    Sexual activity: Not on file       Review of Systems   Respiratory:  Positive for shortness of breath.    Cardiovascular:  Positive for chest pain.   All other systems reviewed and are negative.    Objective:     Vital Signs (Most Recent):  Temp: 98.5 °F (36.9 °C) (03/11/23 0738)  Pulse: 94 (03/11/23 0738)  Resp: 18 (03/11/23 0738)  BP: (!) 165/71 (03/11/23 0738)  SpO2: (!) 94 % (03/11/23 0738)   Vital Signs (24h Range):  Temp:  [98 °F (36.7 °C)-98.5 °F (36.9 °C)] 98.5 °F (36.9 °C)  Pulse:  [79-98] 94  Resp:  [17-23] 18  SpO2:  [94 %-99 %] 94 %  BP: (148-191)/(61-79) 165/71     Weight: 53 kg (116 lb 13.5 oz)  There is no height or weight on file to calculate BMI.    SpO2: (!) 94 %       No intake or output data in the 24 hours ending 03/11/23 0836    Lines/Drains/Airways       Peripheral Intravenous Line  Duration                  Peripheral IV - Single Lumen 03/10/23 1900 20 G Left Antecubital <1 day                    Significant Labs:  Recent Results (from the past 72 hour(s))   CBC with Differential    Collection Time: 03/10/23 12:21 PM   Result Value Ref Range    WBC 6.6 4.5 - 11.5 x10(3)/mcL    RBC 3.47 (L) 4.20 - 5.40 x10(6)/mcL    Hgb 8.1 (L) 12.0 - 16.0 g/dL    Hct 26.8 (L) 37.0 - 47.0 %    MCV 77.2 (L) 80.0 - 94.0 fL    MCH 23.3 pg    MCHC 30.2 (L) 33.0 - 36.0 g/dL    RDW 16.4 11.5 - 17.0 %    Platelet 322 130 - 400 x10(3)/mcL    MPV 10.4 7.4 - 10.4 fL    Neut % 59.8 %    Lymph % 25.0 %    Mono % 12.7 %    Eos % 1.2 %    Basophil % 1.1 %    Lymph # 1.65 0.6 - 4.6 x10(3)/mcL    Neut # 3.95 2.1 - 9.2 x10(3)/mcL    Mono # 0.84 0.1 - 1.3 x10(3)/mcL    Eos # 0.08 0 - 0.9 x10(3)/mcL    Baso # 0.07 0 - 0.2 x10(3)/mcL    IG# 0.01 0 - 0.04 x10(3)/mcL    IG% 0.2 %    NRBC% 0.0 %   Troponin I    Collection Time: 03/10/23 12:21 PM   Result Value Ref Range    Troponin-I 0.054 (H) 0.000 - 0.045 ng/mL   Comprehensive Metabolic Panel    Collection Time: 03/10/23  1:49 PM   Result Value Ref  Range    Sodium Level 141 132 - 146 mmol/L    Potassium Level 4.0 3.5 - 5.1 mmol/L    Chloride 111 98 - 111 mmol/L    Carbon Dioxide 20 (L) 23 - 31 mmol/L    Glucose Level 81 75 - 121 mg/dL    Blood Urea Nitrogen 20.1 9.8 - 20.1 mg/dL    Creatinine 0.85 0.55 - 1.02 mg/dL    Calcium Level Total 9.2 8.4 - 10.2 mg/dL    Protein Total 7.5 5.8 - 7.6 gm/dL    Albumin Level 4.0 3.4 - 4.8 g/dL    Globulin 3.5 2.4 - 3.5 gm/dL    Albumin/Globulin Ratio 1.1 1.1 - 2.0 ratio    Bilirubin Total 0.4 <=1.5 mg/dL    Alkaline Phosphatase 51 40 - 150 unit/L    Alanine Aminotransferase 9 0 - 55 unit/L    Aspartate Aminotransferase 21 5 - 34 unit/L    eGFR >60 mls/min/1.73/m2   Troponin I    Collection Time: 03/10/23  3:24 PM   Result Value Ref Range    Troponin-I 0.047 (H) 0.000 - 0.045 ng/mL   Troponin I    Collection Time: 03/10/23  9:07 PM   Result Value Ref Range    Troponin-I 0.043 0.000 - 0.045 ng/mL   C-Reactive Protein    Collection Time: 03/10/23  9:07 PM   Result Value Ref Range    C-Reactive Protein 4.90 <5.00 mg/L   Ferritin    Collection Time: 03/10/23  9:07 PM   Result Value Ref Range    Ferritin Level 5.75 4.63 - 204.00 ng/mL   Iron and TIBC    Collection Time: 03/10/23  9:07 PM   Result Value Ref Range    Iron Binding Capacity Unsaturated 361 (H) 70 - 310 ug/dL    Iron Level 42 (L) 50 - 170 ug/dL    Transferrin 365 mg/dL    Iron Binding Capacity Total 403 250 - 450 ug/dL    Iron Saturation 10 (L) 20 - 50 %   Vitamin B12    Collection Time: 03/10/23  9:07 PM   Result Value Ref Range    Vitamin B12 Level 201 (L) 213 - 816 pg/mL   Sedimentation rate    Collection Time: 03/10/23 10:08 PM   Result Value Ref Range    Sed Rate 13 0 - 20 mm/hr   BNP    Collection Time: 03/10/23 10:08 PM   Result Value Ref Range    Natriuretic Peptide 695.8 (H) <=100.0 pg/mL   Troponin I    Collection Time: 03/11/23  5:08 AM   Result Value Ref Range    Troponin-I 0.062 (H) 0.000 - 0.045 ng/mL   Comprehensive Metabolic Panel    Collection Time:  03/11/23  5:08 AM   Result Value Ref Range    Sodium Level 141 132 - 146 mmol/L    Potassium Level 4.3 3.5 - 5.1 mmol/L    Chloride 110 98 - 111 mmol/L    Carbon Dioxide 19 (L) 23 - 31 mmol/L    Glucose Level 93 75 - 121 mg/dL    Blood Urea Nitrogen 15.2 9.8 - 20.1 mg/dL    Creatinine 0.73 0.55 - 1.02 mg/dL    Calcium Level Total 9.0 8.4 - 10.2 mg/dL    Protein Total 6.8 5.8 - 7.6 gm/dL    Albumin Level 3.7 3.4 - 4.8 g/dL    Globulin 3.1 2.4 - 3.5 gm/dL    Albumin/Globulin Ratio 1.2 1.1 - 2.0 ratio    Bilirubin Total 0.7 <=1.5 mg/dL    Alkaline Phosphatase 46 40 - 150 unit/L    Alanine Aminotransferase 11 0 - 55 unit/L    Aspartate Aminotransferase 34 5 - 34 unit/L    eGFR >60 mls/min/1.73/m2   Lipid Panel    Collection Time: 03/11/23  5:08 AM   Result Value Ref Range    Cholesterol Total 143 <=200 mg/dL    HDL Cholesterol 52 35 - 60 mg/dL    Triglyceride 97 37 - 140 mg/dL    Cholesterol/HDL Ratio 3 0 - 5    Very Low Density Lipoprotein 19     LDL Cholesterol 72.00 50.00 - 140.00 mg/dL   Hemoglobin A1C    Collection Time: 03/11/23  5:08 AM   Result Value Ref Range    Hemoglobin A1c 6.3 <=7.0 %    Estimated Average Glucose 134.1 mg/dL   Folate    Collection Time: 03/11/23  5:08 AM   Result Value Ref Range    Folate Level 14.1 7.0 - 31.4 ng/mL   Phosphorus    Collection Time: 03/11/23  5:08 AM   Result Value Ref Range    Phosphorus Level 3.7 2.3 - 4.7 mg/dL   Magnesium    Collection Time: 03/11/23  5:08 AM   Result Value Ref Range    Magnesium Level 2.10 1.60 - 2.60 mg/dL   CBC with Differential    Collection Time: 03/11/23  5:08 AM   Result Value Ref Range    WBC 8.1 4.5 - 11.5 x10(3)/mcL    RBC 3.26 (L) 4.20 - 5.40 x10(6)/mcL    Hgb 7.6 (L) 12.0 - 16.0 g/dL    Hct 25.2 (L) 37.0 - 47.0 %    MCV 77.3 (L) 80.0 - 94.0 fL    MCH 23.3 pg    MCHC 30.2 (L) 33.0 - 36.0 g/dL    RDW 16.7 11.5 - 17.0 %    Platelet 260 130 - 400 x10(3)/mcL    MPV 10.9 (H) 7.4 - 10.4 fL    Neut % 75.1 %    Lymph % 14.5 %    Mono % 8.9 %    Eos %  0.4 %    Basophil % 0.7 %    Lymph # 1.17 0.6 - 4.6 x10(3)/mcL    Neut # 6.04 2.1 - 9.2 x10(3)/mcL    Mono # 0.72 0.1 - 1.3 x10(3)/mcL    Eos # 0.03 0 - 0.9 x10(3)/mcL    Baso # 0.06 0 - 0.2 x10(3)/mcL    IG# 0.03 0 - 0.04 x10(3)/mcL    IG% 0.4 %    NRBC% 0.0 %       Significant Imaging:  Imaging Results              X-Ray Chest PA And Lateral (Final result)  Result time 03/10/23 20:45:27      Final result by Mateo Grover MD (03/10/23 20:45:27)                   Impression:      Cardiomegaly without acute cardiopulmonary abnormality.      Electronically signed by: Mateo Grover MD  Date:    03/10/2023  Time:    20:45               Narrative:    EXAMINATION:  Two view chest radiograph.    CLINICAL HISTORY:  Chest Pain;    TECHNIQUE:  Two view of the chest.    COMPARISON:  Chest radiograph 03/10/2023.    FINDINGS:  The lungs are clear without consolidation or effusion.  There is no pneumothorax.  The cardiac silhouette is enlarged.  There is no acute osseous abnormality.                                       X-Ray Chest AP Portable (Final result)  Result time 03/10/23 17:07:26      Final result by Junior Taveras MD (03/10/23 17:07:26)                   Impression:      NO ACUTE CARDIOPULMONARY PROCESS IDENTIFIED.      Electronically signed by: Junior Taveras  Date:    03/10/2023  Time:    17:07               Narrative:    EXAMINATION:  XR CHEST AP PORTABLE    CLINICAL HISTORY:  Chest pain, unspecified    TECHNIQUE:  One view.    COMPARISON:  None available.    FINDINGS:  Cardiopericardial silhouette is within normal limits.  No acute dense focal or segmental consolidation, congestive process, pleural effusions or pneumothorax.                                      EKG:    Results for orders placed or performed during the hospital encounter of 03/10/23   EKG 12-lead    Narrative    Test Reason : R07.9,    Vent. Rate : 085 BPM     Atrial Rate : 085 BPM     P-R Int : 146 ms          QRS Dur : 122 ms      QT Int : 446 ms        P-R-T Axes : 062 -36 087 degrees     QTc Int : 530 ms    Normal sinus rhythm  Left axis deviation  Left bundle branch block  Abnormal ECG  Confirmed by Winston Aparicio MD ([id number]) on 3/10/2023 9:13:18 PM    Referred By: VENESSA   SELF           Confirmed By:Winston Aparicio MD       Telemetry:  NSR    Physical Exam  Vitals reviewed.   Constitutional:       Appearance: Normal appearance.   Cardiovascular:      Rate and Rhythm: Normal rate and regular rhythm.      Pulses: Normal pulses.      Heart sounds: Normal heart sounds.   Pulmonary:      Effort: Pulmonary effort is normal.      Breath sounds: Normal breath sounds.   Abdominal:      General: Bowel sounds are normal.      Palpations: Abdomen is soft.   Musculoskeletal:         General: Normal range of motion.   Skin:     General: Skin is warm and dry.      Capillary Refill: Capillary refill takes less than 2 seconds.   Neurological:      General: No focal deficit present.      Mental Status: She is alert and oriented to person, place, and time.       Home Medications:   No current facility-administered medications on file prior to encounter.     Current Outpatient Medications on File Prior to Encounter   Medication Sig Dispense Refill    lansoprazole (PREVACID) 30 MG capsule Take 30 mg by mouth.      verapamiL (CALAN-SR) 240 MG CR tablet Take 240 mg by mouth.      aspirin 81 MG Chew Take 81 mg by mouth once daily.      hydroCHLOROthiazide (HYDRODIURIL) 25 MG tablet Take 25 mg by mouth.         Current Inpatient Medications:    Current Facility-Administered Medications:     acetaminophen tablet 1,000 mg, 1,000 mg, Oral, Q6H PRN, Nate Trujillo MD    acetaminophen tablet 650 mg, 650 mg, Oral, Q4H PRN, Nate Trujillo MD    aluminum-magnesium hydroxide-simethicone 200-200-20 mg/5 mL suspension 30 mL, 30 mL, Oral, QID PRN, Nate Trujillo MD    aspirin EC tablet 81 mg, 81 mg, Oral, Daily, Nate Trujillo MD    cloNIDine tablet 0.2 mg, 0.2 mg, Oral, TID PRN, Nate Trujillo MD     hydrALAZINE injection 20 mg, 20 mg, Intravenous, Q4H PRN, Nate Trujillo MD, 20 mg at 03/11/23 0004    labetaloL injection 10 mg, 10 mg, Intravenous, Q4H PRN, Nate Trujillo MD    melatonin tablet 6 mg, 6 mg, Oral, Nightly PRN, Nate Trujillo MD, 6 mg at 03/10/23 2322    metoprolol succinate (TOPROL-XL) 24 hr tablet 50 mg, 50 mg, Oral, Daily, Nate Trujillo MD    nitroGLYCERIN 0.6 mg/hr 1 patch, 1 patch, Transdermal, Once, Nate Trujillo MD, 1 patch at 03/11/23 0149    nitroGLYCERIN SL tablet 0.4 mg, 0.4 mg, Sublingual, Q5 Min PRN, Nate Trujillo MD, 0.4 mg at 03/11/23 0047    ondansetron injection 4 mg, 4 mg, Intravenous, Q4H PRN, aNte Trujillo MD    pantoprazole EC tablet 40 mg, 40 mg, Oral, Daily, Nate Trujillo MD    polyethylene glycol packet 17 g, 17 g, Oral, BID PRN, Nate Trujillo MD    prochlorperazine injection Soln 5 mg, 5 mg, Intravenous, Q6H PRN, Nate Trujillo MD    senna-docusate 8.6-50 mg per tablet 2 tablet, 2 tablet, Oral, BID PRN, Nate Trujillo MD    simethicone chewable tablet 80 mg, 1 tablet, Oral, QID PRN, Nate Trujillo MD    sodium chloride 0.9% flush 10 mL, 10 mL, Intravenous, PRN, Nate Trujillo MD    traMADoL tablet 50 mg, 50 mg, Oral, Q6H PRN, Nate Trujillo MD, 50 mg at 03/10/23 2322    verapamiL CR tablet 240 mg, 240 mg, Oral, Daily, BRIDGET Rivero         VTE Risk Mitigation (From admission, onward)           Ordered     IP VTE HIGH RISK PATIENT  Once         03/10/23 2204     Place sequential compression device  Until discontinued         03/10/23 2204                    Assessment:     Impression:  NSTEMI    -low level troponins 0.05 max   -EKG revealed NSR with left BBB  HTN (above goal)  GERD  Anemia    Plan:     Obtain echocardiogram  Continue home medications  Start Isosorbide 30 mg po daily  D/C nitro paste  Patient will need anemia worked up prior to invasive cardiac workup  If echocardiogram is ok, will do an outpatient nuclear stress test    IDon MD,performed the substantive portion of this  visit. I had a face-to-face time with the patient on 3/11/23. I reviewed and agree with the nurse practitioner's history, physical exam and plan of care.       Physical exam:    CV: RRR  Resp: CTA B  Extremities: NO CC     Medical decision making:   Continue medical tx.  Cardiac echo to evaluate LV function and wall motion.  Perform outpatient stress if no significant abnormalities.    Thank you for your consult.     BRIDGET Santamaria  Cardiology  [facility]  03/11/2023 8:36 AM

## 2023-03-11 NOTE — NURSING
Patient AAOx4 with no s/s of acute distress.  Denies chest pain.  On room-air.  IV c/d/I.  Safety checks performed.  No stool for occult sample this shift;  oncoming nurse notified.  All needs addressed.  Consult to cardiology per .

## 2023-03-11 NOTE — H&P
"[facility] Hospital Medicine   History & Physical Note      Patient Name: Simin Gudino  : 1931  MRN: [id number]  PCP: Se Patel MD  Admitting Service: Hospital Medicine  Attending Physician: Nate Trujillo MD  Admission Date: 3/10/2023 - IP- Inpatient   Length of Stay: 0  History source: EMR, patient and/or patient's family  Code status: Full    Chief Complaint   Arm Pain (C/O L side arm pain and jaw pain, worse at night. Intermittent for "a while." Reports starts at night and usually gone by morning.  Denies pain at this time. Denies injury /fall.)      History of Present Illness   Mr. Gudino is a 89 yo male with hx HTN presents to the ED with c/o upper back pain radiating to left arm/jaw that started last night and continue into the morning. Daughter states arm/jaw pain have been intermittent for past few months at night time only, however this morning the pain was still present so she presented to the ED for evaluation. No recent trauma, falls, hx of CAD/MI.     Initial ED VS:  T 98.4°, HR 85, RR 18, /67, O2 99% RA.  EKG NSR, abnormal R-wave progression in the precordial leads.  Troponin 0.054 with repeat troponin 0.047.  Hemoglobin 8.1, hematocrit 26.8, MCV 77.  No prior labs to compare, no history of anemia, melena, hematochezia or hematemesis.  Cardiology was consulted and due to anemia acid the hospitalist be attending.  Patient was given full-dose Lovenox x1 in the ED. Nitro patch placed on patient with improvement in CP.     ROS   Except as documented, all other systems reviewed and negative     Past Medical History   Essential HTN    Past Surgical History   Negative per pt    Social History   No tobacco, alcohol or illicit drug use    Family History   Reviewed and negative    Allergies   Patient has no known allergies.    Home Medications     Prior to Admission medications    Medication Sig Start Date End Date Taking? Authorizing Provider   lansoprazole " (PREVACID) 30 MG capsule Take 30 mg by mouth. 2/24/23  Yes Historical Provider   verapamiL (CALAN-SR) 240 MG CR tablet Take 240 mg by mouth. 3/2/23  Yes Historical Provider   aspirin 81 MG Chew Take 81 mg by mouth once daily.    Historical Provider   hydroCHLOROthiazide (HYDRODIURIL) 25 MG tablet Take 25 mg by mouth. 2/16/23   Historical Provider          Physical Exam   Vital Signs  Temp:  [98.4 °F (36.9 °C)]   Pulse:  [79-87]   Resp:  [17-23]   BP: (160-191)/(61-79)   SpO2:  [97 %-99 %]    General: Appears comfortable  HEENT: NC/AT  Neck:  No JVD  Chest: CTABL  CVS: Regular rhythm. Normal S1/S2.  Abdomen: nondistended, normoactive BS, soft and non-tender.  MSK: No obvious deformity or joint swelling  Skin: Warm and dry  Neuro: AAOx3, no focal neurological deficit  Psych: Cooperative    Labs     Recent Labs     03/10/23  1221   WBC 6.6   RBC 3.47*   HGB 8.1*   HCT 26.8*   MCV 77.2*   MCH 23.3   MCHC 30.2*   RDW 16.4        No results for input(s): PROTIME, INR, PTT, D-DIMER, FERRITIN, IRON, TRANS, TIBC, LABIRON, SGCCEGEB20, FOLATE, LDH, HAPTOGLOBIN, RETICCNTAUTO, RETABS, PERIPSMEAREV in the last 72 hours.   Recent Labs     03/10/23  1349      K 4.0   CHLORIDE 111   CO2 20*   BUN 20.1   CREATININE 0.85   EGFRNORACEVR >60   GLUCOSE 81   CALCIUM 9.2   ALBUMIN 4.0   GLOBULIN 3.5   ALKPHOS 51   ALT 9   AST 21   BILITOT 0.4     No results for input(s): LACTIC in the last 72 hours.  Recent Labs     03/10/23  1221 03/10/23  1524 03/10/23  2107   TROPONINI 0.054* 0.047* 0.043          Microbiology Results (last 7 days)       ** No results found for the last 168 hours. **           Imaging     X-Ray Chest PA And Lateral   Final Result      Cardiomegaly without acute cardiopulmonary abnormality.         Electronically signed by: Mateo Grover MD   Date:    03/10/2023   Time:    20:45      X-Ray Chest AP Portable   Final Result      NO ACUTE CARDIOPULMONARY PROCESS IDENTIFIED.         Electronically signed by: Junior  Taveras   Date:    03/10/2023   Time:    17:07        Assessment & Plan   NSTEMI  Microcytic Anemia (no prior labs to compare)   Essential HTN  Cardiomegaly with elevated BNP    PLAN:   - Telemetry. Trend Trop. Cardiology consult pending  - Lovenox 1mg/kg x1. Nitropaste applied in the ED. Echocardiogram  - ASA 81 mg daily.   - Anemia profile, ferritin, b12, folate, hemocult  - PRN antihypertensives. Resume home amlodipine  - CBC, CMP in AM  - VTE Prophylaxis: Tammy GARCIA, Guera Woodall, FNP-C have discussed this patients case with Dr. Trujillo who agrees with the diagnosis and treatment plan.

## 2023-03-11 NOTE — PROGRESS NOTES
[facility] Medicine Progress Note        Chief Complaint: Inpatient Follow-up for     Subjective:  89-year-old female with history of hypertension on verapamil and HCTZ present to the ED with complaint of chest pain.  History obtained from patient and her daughter at bedside.  Report her symptom initially started about 2-3 months ago with intermittent episode of left shoulder/arm and left jaw pain that wakes her up from sleep at night and usually resolved by morning, she did not seek any medical attention for that.  This past night she had similar episode of pain but it did not resolve and started having chest pain described as substernal pressure and heaviness.  On arrival she was hypotensive, labs notable for elevated troponin, EKG notable for poor R-wave progression in chest leads.  Pain improved with sublingual nitro on arrival to ED.    Seen and examined the patient.   At this time she does not have chest pain or shortness O breath,   -she appears comfortable.     Physical exam:    No JVD, Lungs clear to auscultation,   Normal heart sound with no murmurs, no pedal edema.      Assessment and Plan:  ACS/NSTEMI --suspect multivessel disease  HTN urgency  Iron-deficiency anemia  B12 deficiency     - Cardiology is on board,   - Noted EKG findings troponin showed mild elevation,   - Echocardiogram shows EF 35% with mild left ventricular global hypokinesis,   - With the chest pains she will most likely need left heart catheterization.  - Will get FOBT, noted the iron deficiency anemia.  - Continue aspirin 81 mg,   - Metoprolol verapamil 240 and Imuran 30 mg     Critical care time: 35 min  Critical care diagnosis: ACS      Objective/physical exam:  General: In no acute distress, AAOx3   Chest: Clear to auscultation bilaterally, non-labored   Heart: RRR, +S1, S2, no appreciable murmur  Abdomen: Soft, nontender, BS +  MSK: Warm, no lower extremity edema, no clubbing or  cyanosis  Neurologic:  Cranial nerve II-XII intact, Strength 5/5 in all 4 extremities    VITAL SIGNS: 24 HRS MIN & MAX LAST   Temp  Min: 98 °F (36.7 °C)  Max: 98.5 °F (36.9 °C) 98.5 °F (36.9 °C)   BP  Min: 148/60  Max: 191/79 (!) 148/60     Pulse  Min: 68  Max: 98  68   Resp  Min: 18  Max: 23 18   SpO2  Min: 93 %  Max: 98 % (!) 93 %         Labs, Microbiology and Imaging were Reviewed.      Microbiology Results (last 7 days)       ** No results found for the last 168 hours. **               Medications   aspirin  81 mg Oral Daily    cyanocobalamin  1,000 mcg Intramuscular Daily    ferric gluconate (FERRLECIT) IVPB  125 mg Intravenous Daily    folic acid  1 mg Oral Daily    isosorbide mononitrate  30 mg Oral Daily    metoprolol succinate  50 mg Oral Daily    pantoprazole  40 mg Oral Daily    verapamiL  240 mg Oral Daily        acetaminophen, acetaminophen, aluminum-magnesium hydroxide-simethicone, cloNIDine, hydrALAZINE, labetalol, melatonin, nitroGLYCERIN, ondansetron, polyethylene glycol, prochlorperazine, senna-docusate 8.6-50 mg, simethicone, sodium chloride 0.9%, traMADoL     Radiology:  Echo  · The left ventricle is normal in size with concentric hypertrophy and   mildly decreased systolic function.  · The estimated ejection fraction is 35-40.  · Grade I left ventricular diastolic dysfunction.  · There is mild left ventricular global hypokinesis.  · Normal right ventricular size with normal right ventricular systolic   function.  · Mild left atrial enlargement.  · Mild mitral regurgitation.  · Mild to moderate tricuspid regurgitation.  · Mild pulmonic regurgitation.  · Intermediate central venous pressure (8 mmHg).  · The estimated PA systolic pressure is 63 mmHg.  · There is moderate to severe pulmonary hypertension.             Assessment/Plan:      All diagnosis and differential diagnosis have been reviewed; assessment and plan has been documented; I have personally reviewed the labs and test results that  are presently available; I have reviewed the patients medication list; I have reviewed the consulting providers response and recommendations. I have reviewed or attempted to review medical records based upon their availability.       Christos Benson MD   03/11/2023

## 2023-03-11 NOTE — NURSING
Dr. Madison notified.  Patient arrived to the floor 6/10 chest pain.  Elevated blood pressure after Tramadol and hydralazine.

## 2023-03-11 NOTE — NURSING
Patient arrived to the floor. Chest pain.  Tramadol and hydralazine administered.  Ineffective.  Physician notified.  Nitro sublingual and patch effective.  No skin breaks.  IV c/d/I.  Safety checks performed.

## 2023-03-12 LAB
ABO + RH BLD: NORMAL
ALBUMIN SERPL-MCNC: 3.6 G/DL (ref 3.4–4.8)
ALBUMIN/GLOB SERPL: 1.1 RATIO (ref 1.1–2)
ALP SERPL-CCNC: 49 UNIT/L (ref 40–150)
ALT SERPL-CCNC: 10 UNIT/L (ref 0–55)
AST SERPL-CCNC: 16 UNIT/L (ref 5–34)
BASOPHILS # BLD AUTO: 0.07 X10(3)/MCL (ref 0–0.2)
BASOPHILS NFR BLD AUTO: 0.7 %
BILIRUBIN DIRECT+TOT PNL SERPL-MCNC: 1.9 MG/DL
BLD PROD TYP BPU: NORMAL
BLOOD UNIT EXPIRATION DATE: NORMAL
BLOOD UNIT TYPE CODE: 6200
BUN SERPL-MCNC: 18.6 MG/DL (ref 9.8–20.1)
CALCIUM SERPL-MCNC: 9.3 MG/DL (ref 8.4–10.2)
CHLORIDE SERPL-SCNC: 105 MMOL/L (ref 98–111)
CO2 SERPL-SCNC: 22 MMOL/L (ref 23–31)
CREAT SERPL-MCNC: 0.89 MG/DL (ref 0.55–1.02)
CROSSMATCH INTERPRETATION: NORMAL
DISPENSE STATUS: NORMAL
EOSINOPHIL # BLD AUTO: 0.06 X10(3)/MCL (ref 0–0.9)
EOSINOPHIL NFR BLD AUTO: 0.6 %
ERYTHROCYTE [DISTWIDTH] IN BLOOD BY AUTOMATED COUNT: 16.1 % (ref 11.5–17)
GFR SERPLBLD CREATININE-BSD FMLA CKD-EPI: >60 MLS/MIN/1.73/M2
GLOBULIN SER-MCNC: 3.4 GM/DL (ref 2.4–3.5)
GLUCOSE SERPL-MCNC: 148 MG/DL (ref 75–121)
HCT VFR BLD AUTO: 29.8 % (ref 37–47)
HGB BLD-MCNC: 9.3 G/DL (ref 12–16)
IMM GRANULOCYTES # BLD AUTO: 0.05 X10(3)/MCL (ref 0–0.04)
IMM GRANULOCYTES NFR BLD AUTO: 0.5 %
LYMPHOCYTES # BLD AUTO: 1.13 X10(3)/MCL (ref 0.6–4.6)
LYMPHOCYTES NFR BLD AUTO: 11.1 %
MCH RBC QN AUTO: 24.3 PG
MCHC RBC AUTO-ENTMCNC: 31.2 G/DL (ref 33–36)
MCV RBC AUTO: 78 FL (ref 80–94)
MONOCYTES # BLD AUTO: 0.97 X10(3)/MCL (ref 0.1–1.3)
MONOCYTES NFR BLD AUTO: 9.5 %
NEUTROPHILS # BLD AUTO: 7.93 X10(3)/MCL (ref 2.1–9.2)
NEUTROPHILS NFR BLD AUTO: 77.6 %
NRBC BLD AUTO-RTO: 0 %
PLATELET # BLD AUTO: 276 X10(3)/MCL (ref 130–400)
PMV BLD AUTO: 9.9 FL (ref 7.4–10.4)
POTASSIUM SERPL-SCNC: 2.8 MMOL/L (ref 3.5–5.1)
PROT SERPL-MCNC: 7 GM/DL (ref 5.8–7.6)
RBC # BLD AUTO: 3.82 X10(6)/MCL (ref 4.2–5.4)
RET# (OHS): 0.02 (ref 0.02–0.08)
RETICULOCYTE COUNT AUTOMATED (OLG): 0.64 % (ref 1.1–2.1)
SODIUM SERPL-SCNC: 142 MMOL/L (ref 132–146)
TROPONIN I SERPL-MCNC: 0.12 NG/ML (ref 0–0.04)
UNIT NUMBER: NORMAL
WBC # SPEC AUTO: 10.2 X10(3)/MCL (ref 4.5–11.5)

## 2023-03-12 PROCEDURE — 85045 AUTOMATED RETICULOCYTE COUNT: CPT | Performed by: STUDENT IN AN ORGANIZED HEALTH CARE EDUCATION/TRAINING PROGRAM

## 2023-03-12 PROCEDURE — 21400001 HC TELEMETRY ROOM

## 2023-03-12 PROCEDURE — 36430 TRANSFUSION BLD/BLD COMPNT: CPT

## 2023-03-12 PROCEDURE — 84484 ASSAY OF TROPONIN QUANT: CPT | Performed by: INTERNAL MEDICINE

## 2023-03-12 PROCEDURE — 25000003 PHARM REV CODE 250: Performed by: NURSE PRACTITIONER

## 2023-03-12 PROCEDURE — 25000003 PHARM REV CODE 250: Performed by: INTERNAL MEDICINE

## 2023-03-12 PROCEDURE — 63600175 PHARM REV CODE 636 W HCPCS: Performed by: INTERNAL MEDICINE

## 2023-03-12 PROCEDURE — 85060 BLOOD SMEAR INTERPRETATION: CPT | Performed by: STUDENT IN AN ORGANIZED HEALTH CARE EDUCATION/TRAINING PROGRAM

## 2023-03-12 PROCEDURE — P9016 RBC LEUKOCYTES REDUCED: HCPCS | Performed by: INTERNAL MEDICINE

## 2023-03-12 PROCEDURE — 85025 COMPLETE CBC W/AUTO DIFF WBC: CPT | Performed by: STUDENT IN AN ORGANIZED HEALTH CARE EDUCATION/TRAINING PROGRAM

## 2023-03-12 PROCEDURE — 80053 COMPREHEN METABOLIC PANEL: CPT | Performed by: STUDENT IN AN ORGANIZED HEALTH CARE EDUCATION/TRAINING PROGRAM

## 2023-03-12 RX ORDER — MAGNESIUM SULFATE 1 G/100ML
1 INJECTION INTRAVENOUS ONCE
Status: COMPLETED | OUTPATIENT
Start: 2023-03-12 | End: 2023-03-12

## 2023-03-12 RX ADMIN — METOPROLOL SUCCINATE 50 MG: 50 TABLET, EXTENDED RELEASE ORAL at 09:03

## 2023-03-12 RX ADMIN — SACUBITRIL AND VALSARTAN 1 TABLET: 24; 26 TABLET, FILM COATED ORAL at 10:03

## 2023-03-12 RX ADMIN — SACUBITRIL AND VALSARTAN 1 TABLET: 24; 26 TABLET, FILM COATED ORAL at 11:03

## 2023-03-12 RX ADMIN — PANTOPRAZOLE SODIUM 40 MG: 40 TABLET, DELAYED RELEASE ORAL at 09:03

## 2023-03-12 RX ADMIN — FOLIC ACID 1 MG: 1 TABLET ORAL at 09:03

## 2023-03-12 RX ADMIN — POTASSIUM BICARBONATE 50 MEQ: 977.5 TABLET, EFFERVESCENT ORAL at 10:03

## 2023-03-12 RX ADMIN — SODIUM CHLORIDE 125 MG: 9 INJECTION, SOLUTION INTRAVENOUS at 09:03

## 2023-03-12 RX ADMIN — VERAPAMIL HYDROCHLORIDE 240 MG: 240 TABLET, FILM COATED, EXTENDED RELEASE ORAL at 10:03

## 2023-03-12 RX ADMIN — ISOSORBIDE MONONITRATE 30 MG: 30 TABLET, EXTENDED RELEASE ORAL at 09:03

## 2023-03-12 RX ADMIN — MAGNESIUM SULFATE IN DEXTROSE 1 G: 10 INJECTION, SOLUTION INTRAVENOUS at 10:03

## 2023-03-12 RX ADMIN — ASPIRIN 81 MG: 81 TABLET, COATED ORAL at 09:03

## 2023-03-12 RX ADMIN — CYANOCOBALAMIN 1000 MCG: 1000 INJECTION, SOLUTION INTRAMUSCULAR; SUBCUTANEOUS at 09:03

## 2023-03-12 RX ADMIN — ATORVASTATIN CALCIUM 40 MG: 40 TABLET, FILM COATED ORAL at 10:03

## 2023-03-12 NOTE — CARE UPDATE
Troponin keeps trending up    Recent Labs     03/11/23  2105 03/11/23  0933 03/11/23  0508 03/10/23  2107 03/10/23  1524 03/10/23  1221   TROPONINI 0.092* 0.066* 0.062* 0.043 0.047* 0.054*      Echo noted for LV global hypokinesis with EF 35-40%.  Hemoglobin trended down to 7.6.    Given patient meets criteria for ACS and requiring multiple antianginal agents (Imdur, BB, CCB) at this time will transfuse 1 unit PRBC.  Lasix 40 IV x1.

## 2023-03-12 NOTE — CONSULTS
"[facility]    CC: anemia    HPI 89 y.o. female who originally presented with chest pain and upper back pain relieved by nitroglycerin.  She was determined to have an NSTEMI.  Labs were also significant with a likely chronic microcytic, potentially multifactorial anemia not associated with any overt GI blood loss.  She is not currently on blood thinners.  No obvious overt GI bleeding.    Past Medical History  History reviewed. No pertinent past medical history.      Review of Systems  General ROS: negative for chills, fever or weight loss  Cardiovascular ROS: no chest pain or dyspnea on exertion  Gastrointestinal ROS: no abdominal pain, change in bowel habits, or black/ bloody stools    Physical Examination  BP (!) 175/74   Pulse 85   Temp 98.5 °F (36.9 °C) (Oral)   Resp 20   Ht 5' 1.02" (1.55 m)   Wt 52.6 kg (116 lb)   SpO2 95%   BMI 21.90 kg/m²   General appearance: alert, cooperative, no distress  HENT: Normocephalic, atraumatic, neck symmetrical, no nasal discharge   Lungs: clear to auscultation bilaterally, no dullness to percussion bilaterally  Heart: regular rate and rhythm without rub; no displacement of the PMI   Abdomen: soft, non-tender; bowel sounds normoactive; no organomegaly  Extremities: extremities symmetric; no clubbing, cyanosis, or edema  Neurologic: Alert and oriented X 3, normal strength, normal coordination and gait    Labs:  Lab Results   Component Value Date    WBC 8.1 03/11/2023    HGB 7.6 (L) 03/11/2023    HCT 25.2 (L) 03/11/2023    MCV 77.3 (L) 03/11/2023     03/11/2023           Imaging:    I have personally reviewed and interpreted these images.    Assessment:   89-year-old female with iron-deficiency anemia with low ferritin but no obvious overt bleeding.  I suspect her NSTEMI was potentially secondary to her anemia.  Her last colonoscopy was around 5 years ago with Dr. Palmer.  Given the severity of her anemia, plan EGD tomorrow for initial " evaluation.    Plan:  Okay for regular diet today  NPO after midnight  Plan EGD tomorrow      SUMANTH Newman Jr., M.D.  [facility]

## 2023-03-12 NOTE — PLAN OF CARE
Problem: Adult Inpatient Plan of Care  Goal: Plan of Care Review  Outcome: Ongoing, Progressing  Goal: Patient-Specific Goal (Individualized)  Outcome: Ongoing, Progressing  Goal: Absence of Hospital-Acquired Illness or Injury  Outcome: Ongoing, Progressing   Patient AAOx4 with no s/s of acute distress.  Denies chest pain. On room-air.  Generalized malaise.  IV c/d/I. No s/s transfusion reaction at this time.  Safety checks performed.  All needs addressed.    5

## 2023-03-12 NOTE — PLAN OF CARE
Problem: Adult Inpatient Plan of Care  Goal: Plan of Care Review  3/12/2023 1016 by Danette Coffey RN  Outcome: Ongoing, Progressing  3/12/2023 0500 by Danette Coffey RN  Outcome: Ongoing, Progressing  Goal: Patient-Specific Goal (Individualized)  3/12/2023 1016 by Danette Coffey RN  Outcome: Ongoing, Progressing  3/12/2023 0500 by Danette Coffey RN  Outcome: Ongoing, Progressing  Goal: Absence of Hospital-Acquired Illness or Injury  3/12/2023 1016 by Danette Coffey RN  Outcome: Ongoing, Progressing  3/12/2023 0500 by Danette Coffey RN  Outcome: Ongoing, Progressing  Goal: Optimal Comfort and Wellbeing  Outcome: Ongoing, Progressing   Patient AAOx4 with no s/s of acute distress or transfusion reaction.  Denies chest pain.  Adequate urine output.  Lungs clear.  IV c/d/I.  Safety checks performed.  All needs addressed.

## 2023-03-12 NOTE — PROGRESS NOTES
[facility]  Cardiology  Progress Note    Patient Name: Simin Gudino  MRN: [id number]  Admission Date: 3/10/2023  Hospital Length of Stay: 2 days  Code Status: Full Code   Attending Physician: Nate Trujillo MD   Primary Care Physician: Se Patel MD  Expected Discharge Date:   Principal Problem:NSTEMI (non-ST elevated myocardial infarction)    Subjective:     Brief HPI:   Ms. Gudino is a 89 year old, unknown to [facility], who presented to [facility] on 3.10.23 with c/o upper back pain radiating to the  left arm and jaw. Her pain was relieved with ntg. She has a history of HTN. Upon workup her troponin was mildly elevated at 0.062 and BNP was 695. EKG revealed nospecific ST abnormalities and left BBB. [facility] is being consulted for NSTEMI.    Hospital Course:   PMH: HTN, GERD  PSH: Negative  Family History: Noncontributory  Social History: No tobacco, alcohol or drug      Previous Cardiac Diagnostics:   Echocardiogram 3.10.23  The left ventricle is normal in size with concentric hypertrophy and mildly decreased systolic function.  The estimated ejection fraction is 35-40.  Grade I left ventricular diastolic dysfunction.  There is mild left ventricular global hypokinesis.  Normal right ventricular size with normal right ventricular systolic function.  Mild left atrial enlargement.  Mild mitral regurgitation.  Mild to moderate tricuspid regurgitation.  Mild pulmonic regurgitation.  Intermediate central venous pressure (8 mmHg).  The estimated PA systolic pressure is 63 mmHg.  There is moderate to severe pulmonary hypertension.    Review of Systems   Cardiovascular: Negative.    Respiratory: Negative.     All other systems reviewed and are negative.    Objective:     Vital Signs (Most Recent):  Temp: 98.5 °F (36.9 °C) (03/12/23 0811)  Pulse: 85 (03/12/23 0811)  Resp: 20 (03/12/23 0735)  BP: (!) 175/74 (03/12/23 0905)  SpO2: 95 % (03/12/23 0811)   Vital Signs (24h Range):  Temp:  [98.1 °F  (36.7 °C)-99.3 °F (37.4 °C)] 98.5 °F (36.9 °C)  Pulse:  [68-85] 85  Resp:  [18-20] 20  SpO2:  [93 %-100 %] 95 %  BP: (141-176)/(52-74) 175/74     Weight: 52.6 kg (116 lb)  Body mass index is 21.9 kg/m².    SpO2: 95 %         Intake/Output Summary (Last 24 hours) at 3/12/2023 1017  Last data filed at 3/12/2023 0811  Gross per 24 hour   Intake 565 ml   Output 900 ml   Net -335 ml       Lines/Drains/Airways       Peripheral Intravenous Line  Duration                  Peripheral IV - Single Lumen 03/12/23 0045 20 G Anterior;Proximal;Right Forearm <1 day                    Significant Labs:   Recent Results (from the past 72 hour(s))   CBC with Differential    Collection Time: 03/10/23 12:21 PM   Result Value Ref Range    WBC 6.6 4.5 - 11.5 x10(3)/mcL    RBC 3.47 (L) 4.20 - 5.40 x10(6)/mcL    Hgb 8.1 (L) 12.0 - 16.0 g/dL    Hct 26.8 (L) 37.0 - 47.0 %    MCV 77.2 (L) 80.0 - 94.0 fL    MCH 23.3 pg    MCHC 30.2 (L) 33.0 - 36.0 g/dL    RDW 16.4 11.5 - 17.0 %    Platelet 322 130 - 400 x10(3)/mcL    MPV 10.4 7.4 - 10.4 fL    Neut % 59.8 %    Lymph % 25.0 %    Mono % 12.7 %    Eos % 1.2 %    Basophil % 1.1 %    Lymph # 1.65 0.6 - 4.6 x10(3)/mcL    Neut # 3.95 2.1 - 9.2 x10(3)/mcL    Mono # 0.84 0.1 - 1.3 x10(3)/mcL    Eos # 0.08 0 - 0.9 x10(3)/mcL    Baso # 0.07 0 - 0.2 x10(3)/mcL    IG# 0.01 0 - 0.04 x10(3)/mcL    IG% 0.2 %    NRBC% 0.0 %   Troponin I    Collection Time: 03/10/23 12:21 PM   Result Value Ref Range    Troponin-I 0.054 (H) 0.000 - 0.045 ng/mL   Comprehensive Metabolic Panel    Collection Time: 03/10/23  1:49 PM   Result Value Ref Range    Sodium Level 141 132 - 146 mmol/L    Potassium Level 4.0 3.5 - 5.1 mmol/L    Chloride 111 98 - 111 mmol/L    Carbon Dioxide 20 (L) 23 - 31 mmol/L    Glucose Level 81 75 - 121 mg/dL    Blood Urea Nitrogen 20.1 9.8 - 20.1 mg/dL    Creatinine 0.85 0.55 - 1.02 mg/dL    Calcium Level Total 9.2 8.4 - 10.2 mg/dL    Protein Total 7.5 5.8 - 7.6 gm/dL    Albumin Level 4.0 3.4 - 4.8 g/dL     Globulin 3.5 2.4 - 3.5 gm/dL    Albumin/Globulin Ratio 1.1 1.1 - 2.0 ratio    Bilirubin Total 0.4 <=1.5 mg/dL    Alkaline Phosphatase 51 40 - 150 unit/L    Alanine Aminotransferase 9 0 - 55 unit/L    Aspartate Aminotransferase 21 5 - 34 unit/L    eGFR >60 mls/min/1.73/m2   Troponin I    Collection Time: 03/10/23  3:24 PM   Result Value Ref Range    Troponin-I 0.047 (H) 0.000 - 0.045 ng/mL   Troponin I    Collection Time: 03/10/23  9:07 PM   Result Value Ref Range    Troponin-I 0.043 0.000 - 0.045 ng/mL   C-Reactive Protein    Collection Time: 03/10/23  9:07 PM   Result Value Ref Range    C-Reactive Protein 4.90 <5.00 mg/L   Ferritin    Collection Time: 03/10/23  9:07 PM   Result Value Ref Range    Ferritin Level 5.75 4.63 - 204.00 ng/mL   Iron and TIBC    Collection Time: 03/10/23  9:07 PM   Result Value Ref Range    Iron Binding Capacity Unsaturated 361 (H) 70 - 310 ug/dL    Iron Level 42 (L) 50 - 170 ug/dL    Transferrin 365 mg/dL    Iron Binding Capacity Total 403 250 - 450 ug/dL    Iron Saturation 10 (L) 20 - 50 %   Vitamin B12    Collection Time: 03/10/23  9:07 PM   Result Value Ref Range    Vitamin B12 Level 201 (L) 213 - 816 pg/mL   Sedimentation rate    Collection Time: 03/10/23 10:08 PM   Result Value Ref Range    Sed Rate 13 0 - 20 mm/hr   BNP    Collection Time: 03/10/23 10:08 PM   Result Value Ref Range    Natriuretic Peptide 695.8 (H) <=100.0 pg/mL   Troponin I    Collection Time: 03/11/23  5:08 AM   Result Value Ref Range    Troponin-I 0.062 (H) 0.000 - 0.045 ng/mL   Comprehensive Metabolic Panel    Collection Time: 03/11/23  5:08 AM   Result Value Ref Range    Sodium Level 141 132 - 146 mmol/L    Potassium Level 4.3 3.5 - 5.1 mmol/L    Chloride 110 98 - 111 mmol/L    Carbon Dioxide 19 (L) 23 - 31 mmol/L    Glucose Level 93 75 - 121 mg/dL    Blood Urea Nitrogen 15.2 9.8 - 20.1 mg/dL    Creatinine 0.73 0.55 - 1.02 mg/dL    Calcium Level Total 9.0 8.4 - 10.2 mg/dL    Protein Total 6.8 5.8 - 7.6 gm/dL     Albumin Level 3.7 3.4 - 4.8 g/dL    Globulin 3.1 2.4 - 3.5 gm/dL    Albumin/Globulin Ratio 1.2 1.1 - 2.0 ratio    Bilirubin Total 0.7 <=1.5 mg/dL    Alkaline Phosphatase 46 40 - 150 unit/L    Alanine Aminotransferase 11 0 - 55 unit/L    Aspartate Aminotransferase 34 5 - 34 unit/L    eGFR >60 mls/min/1.73/m2   Lipid Panel    Collection Time: 03/11/23  5:08 AM   Result Value Ref Range    Cholesterol Total 143 <=200 mg/dL    HDL Cholesterol 52 35 - 60 mg/dL    Triglyceride 97 37 - 140 mg/dL    Cholesterol/HDL Ratio 3 0 - 5    Very Low Density Lipoprotein 19     LDL Cholesterol 72.00 50.00 - 140.00 mg/dL   Hemoglobin A1C    Collection Time: 03/11/23  5:08 AM   Result Value Ref Range    Hemoglobin A1c 6.3 <=7.0 %    Estimated Average Glucose 134.1 mg/dL   Folate    Collection Time: 03/11/23  5:08 AM   Result Value Ref Range    Folate Level 14.1 7.0 - 31.4 ng/mL   Phosphorus    Collection Time: 03/11/23  5:08 AM   Result Value Ref Range    Phosphorus Level 3.7 2.3 - 4.7 mg/dL   Magnesium    Collection Time: 03/11/23  5:08 AM   Result Value Ref Range    Magnesium Level 2.10 1.60 - 2.60 mg/dL   CBC with Differential    Collection Time: 03/11/23  5:08 AM   Result Value Ref Range    WBC 8.1 4.5 - 11.5 x10(3)/mcL    RBC 3.26 (L) 4.20 - 5.40 x10(6)/mcL    Hgb 7.6 (L) 12.0 - 16.0 g/dL    Hct 25.2 (L) 37.0 - 47.0 %    MCV 77.3 (L) 80.0 - 94.0 fL    MCH 23.3 pg    MCHC 30.2 (L) 33.0 - 36.0 g/dL    RDW 16.7 11.5 - 17.0 %    Platelet 260 130 - 400 x10(3)/mcL    MPV 10.9 (H) 7.4 - 10.4 fL    Neut % 75.1 %    Lymph % 14.5 %    Mono % 8.9 %    Eos % 0.4 %    Basophil % 0.7 %    Lymph # 1.17 0.6 - 4.6 x10(3)/mcL    Neut # 6.04 2.1 - 9.2 x10(3)/mcL    Mono # 0.72 0.1 - 1.3 x10(3)/mcL    Eos # 0.03 0 - 0.9 x10(3)/mcL    Baso # 0.06 0 - 0.2 x10(3)/mcL    IG# 0.03 0 - 0.04 x10(3)/mcL    IG% 0.4 %    NRBC% 0.0 %   Troponin I    Collection Time: 03/11/23  9:33 AM   Result Value Ref Range    Troponin-I 0.066 (H) 0.000 - 0.045 ng/mL   Echo     Collection Time: 03/11/23 10:22 AM   Result Value Ref Range    BSA 1.51 m2    TDI SEPTAL 0.07 m/s    LV LATERAL E/E' RATIO 9.63 m/s    LV SEPTAL E/E' RATIO 11.00 m/s    Right Atrial Pressure (from IVC) 8 mmHg    EF 40 %    Left Ventricular Outflow Tract Mean Velocity 0.66 cm/s    Left Ventricular Outflow Tract Mean Gradient 2.00 mmHg    AORTIC VALVE CUSP SEPERATION 1.80 cm    TDI LATERAL 0.08 m/s    LVIDd 4.57 3.5 - 6.0 cm    IVS 1.04 0.6 - 1.1 cm    Posterior Wall 1.06 0.6 - 1.1 cm    LVIDs 3.16 2.1 - 4.0 cm    FS 31 28 - 44 %    LV mass 168.08 g    LA size 3.20 cm    RVDD 3.36 cm    TAPSE 1.84 cm    Left Ventricle Relative Wall Thickness 0.46 cm    AV mean gradient 4 mmHg    AV valve area 1.89 cm2    AV Velocity Ratio 0.80     AV index (prosthetic) 0.74     MV mean gradient 3 mmHg    MV valve area by continuity eq 2.62 cm2    E/A ratio 0.61     Mean e' 0.08 m/s    E wave deceleration time 143.00 msec    LVOT diameter 1.80 cm    LVOT area 2.5 cm2    LVOT peak damien 1.10 m/s    LVOT peak VTI 17.80 cm    Ao peak damien 1.38 m/s    Ao VTI 23.9 cm    LVOT stroke volume 45.27 cm3    AV peak gradient 8 mmHg    MV peak gradient 6 mmHg    TV rest pulmonary artery pressure 63 mmHg    E/E' ratio 10.27 m/s    MV Peak E Damien 0.77 m/s    TR Max Damien 3.70 m/s    MV VTI 17.3 cm    MV Peak A Damien 1.26 m/s    LV Systolic Volume 39.70 mL    LV Systolic Volume Index 26.5 mL/m2    LV Diastolic Volume 95.90 mL    LV Diastolic Volume Index 63.93 mL/m2    LV Mass Index 112 g/m2    RA Major Axis 5.03 cm    Triscuspid Valve Regurgitation Peak Gradient 55 mmHg    LA Volume Index (Mod) 35.9 mL/m2    LA volume (mod) 53.80 cm3    RA Width 4.06 cm   Troponin I    Collection Time: 03/11/23  9:05 PM   Result Value Ref Range    Troponin-I 0.092 (H) 0.000 - 0.045 ng/mL   Prepare RBC 1 Unit    Collection Time: 03/11/23 10:20 PM   Result Value Ref Range    UNIT NUMBER [id number]     UNIT ABO/RH A POS     DISPENSE STATUS Issued     Unit Expiration  [id number]     Product Code I0524I04     Unit Blood Type Code 6200     CROSSMATCH INTERPRETATION Compatible    Type & Screen    Collection Time: 03/11/23 10:20 PM   Result Value Ref Range    Group & Rh A POS     Indirect Mikael GEL NEG    ABORH RETYPE    Collection Time: 03/11/23 10:20 PM   Result Value Ref Range    ABORH Retype A POS        Telemetry:  NSR    Physical Exam  Constitutional:       Appearance: Normal appearance.   Cardiovascular:      Rate and Rhythm: Normal rate and regular rhythm.      Pulses: Normal pulses.      Heart sounds: Normal heart sounds.   Pulmonary:      Effort: Pulmonary effort is normal.      Breath sounds: Normal breath sounds.   Abdominal:      General: Bowel sounds are normal.      Palpations: Abdomen is soft.   Skin:     General: Skin is warm and dry.      Capillary Refill: Capillary refill takes less than 2 seconds.   Neurological:      General: No focal deficit present.      Mental Status: She is alert.       Current Inpatient Medications:    Current Facility-Administered Medications:     0.9%  NaCl infusion (for blood administration), , Intravenous, Q24H PRN, Nate Trujillo MD    acetaminophen tablet 1,000 mg, 1,000 mg, Oral, Q6H PRN, Nate Trujillo MD    acetaminophen tablet 650 mg, 650 mg, Oral, Q4H PRN, Nate Trujillo MD    aluminum-magnesium hydroxide-simethicone 200-200-20 mg/5 mL suspension 30 mL, 30 mL, Oral, QID PRN, Nate Trujillo MD    aspirin EC tablet 81 mg, 81 mg, Oral, Daily, Nate Trujillo MD, 81 mg at 03/12/23 0905    atorvastatin tablet 40 mg, 40 mg, Oral, QHS, Nate Trujillo MD, 40 mg at 03/11/23 2313    cloNIDine tablet 0.2 mg, 0.2 mg, Oral, TID PRN, Nate Trujillo MD    cyanocobalamin injection 1,000 mcg, 1,000 mcg, Intramuscular, Daily, Nate Trujillo MD, 1,000 mcg at 03/12/23 0904    ferric gluconate (FERRLECIT) 125 mg in sodium chloride 0.9% 100 mL IVPB, 125 mg, Intravenous, Daily, Nate Trujillo MD, Stopped at 03/12/23 1004    folic acid tablet 1 mg, 1 mg, Oral, Daily,  Nate Trujillo MD, 1 mg at 03/12/23 0905    hydrALAZINE injection 20 mg, 20 mg, Intravenous, Q4H PRN, Nate Trujillo MD, 20 mg at 03/11/23 0004    isosorbide mononitrate 24 hr tablet 30 mg, 30 mg, Oral, Daily, Kaitlin WILL Hansen, FNP, 30 mg at 03/12/23 0905    labetaloL injection 10 mg, 10 mg, Intravenous, Q4H PRN, Nate Trujillo MD    melatonin tablet 6 mg, 6 mg, Oral, Nightly PRN, Nate Trujillo MD, 6 mg at 03/10/23 2322    metoprolol succinate (TOPROL-XL) 24 hr tablet 50 mg, 50 mg, Oral, Daily, Nate Trujillo MD, 50 mg at 03/12/23 0905    nitroGLYCERIN SL tablet 0.4 mg, 0.4 mg, Sublingual, Q5 Min PRN, Nate Trujillo MD, 0.4 mg at 03/11/23 0047    ondansetron injection 4 mg, 4 mg, Intravenous, Q4H PRN, Nate Trujillo MD    pantoprazole EC tablet 40 mg, 40 mg, Oral, Daily, Nate Trujillo MD, 40 mg at 03/12/23 0905    polyethylene glycol packet 17 g, 17 g, Oral, BID PRN, Nate Trujillo MD    prochlorperazine injection Soln 5 mg, 5 mg, Intravenous, Q6H PRN, Nate Trujillo MD    senna-docusate 8.6-50 mg per tablet 2 tablet, 2 tablet, Oral, BID PRN, Nate Trujillo MD    simethicone chewable tablet 80 mg, 1 tablet, Oral, QID PRN, Nate Trujillo MD    sodium chloride 0.9% flush 10 mL, 10 mL, Intravenous, PRN, Nate Trujillo MD    traMADoL tablet 50 mg, 50 mg, Oral, Q6H PRN, Nate Trujillo MD, 50 mg at 03/11/23 0927    verapamiL CR tablet 240 mg, 240 mg, Oral, Daily, Guera Woodall, FNP, 240 mg at 03/11/23 0926    VTE Risk Mitigation (From admission, onward)           Ordered     IP VTE HIGH RISK PATIENT  Once         03/10/23 2204     Place sequential compression device  Until discontinued         03/10/23 2204                    Assessment:     Impression:  NSTEMI    -chest pain free                   -low level troponins 0.05 max                   -EKG revealed NSR with left BBB  Acute Systolic Heart Failure   -EF 35-40%  Mod-severe pulmonary HTN  HTN (above goal)  GERD    Plan:     Continue home medications  Contnue Isosorbide 30 mg po daily  Start  Entresto 24/26 mg po bid  Accurate I & O  Outpatient LexiPet  Follow up with Dr. Tiwari as an outpatient  Will be available as needed.    BRIDGET Santamaria  Cardiology  [facility]  03/12/2023

## 2023-03-12 NOTE — PROGRESS NOTES
Is [facility] Medicine Progress Note        Chief Complaint: Inpatient Follow-up for     Subjective:  89-year-old female with history of hypertension on verapamil and HCTZ present to the ED with complaint of chest pain.  History obtained from patient and her daughter at bedside.  Report her symptom initially started about 2-3 months ago with intermittent episode of left shoulder/arm and left jaw pain that wakes her up from sleep at night and usually resolved by morning, she did not seek any medical attention for that.  This past night she had similar episode of pain but it did not resolve and started having chest pain described as substernal pressure and heaviness.  On arrival she was hypotensive, labs notable for elevated troponin, EKG notable for poor R-wave progression in chest leads.  Pain improved with sublingual nitro on arrival to ED.    Seen and examined the patient.   Overnight she needed 1 unit of RBCs,   She did not have any symptoms no bowel movement so far pain   Iron deficiency anemia.         Physical exam:    No JVD, Lungs clear to auscultation,   Normal heart sound with no murmurs, no pedal edema.      Assessment and Plan:  ACS/NSTEMI --suspect multivessel disease  HTN urgency  Iron-deficiency anemia  B12 deficiency     - consult GI for iron deficiency anemia,   -monitor CBC hemoglobin,   -cardiology is on board.    -NPO after midnight.  -EF is 35% with global hypokinesis patient will need a stress test.    Critical care time: 35 min  Critical care diagnosis: ACS      Objective/physical exam:  General: In no acute distress, AAOx3   Chest: Clear to auscultation bilaterally, non-labored   Heart: RRR, +S1, S2, no appreciable murmur  Abdomen: Soft, nontender, BS +  MSK: Warm, no lower extremity edema, no clubbing or cyanosis  Neurologic:  Cranial nerve II-XII intact, Strength 5/5 in all 4 extremities    VITAL SIGNS: 24 HRS MIN & MAX LAST   Temp  Min: 98.1 °F (36.7  °C)  Max: 99.3 °F (37.4 °C) 98.5 °F (36.9 °C)   BP  Min: 141/52  Max: 176/64 (!) 155/54     Pulse  Min: 65  Max: 85  65   Resp  Min: 18  Max: 20 18   SpO2  Min: 94 %  Max: 100 % 96 %         Labs, Microbiology and Imaging were Reviewed.      Microbiology Results (last 7 days)       ** No results found for the last 168 hours. **               Medications   aspirin  81 mg Oral Daily    atorvastatin  40 mg Oral QHS    cyanocobalamin  1,000 mcg Intramuscular Daily    ferric gluconate (FERRLECIT) IVPB  125 mg Intravenous Daily    folic acid  1 mg Oral Daily    isosorbide mononitrate  30 mg Oral Daily    metoprolol succinate  50 mg Oral Daily    pantoprazole  40 mg Oral Daily    sacubitriL-valsartan  1 tablet Oral BID    verapamiL  240 mg Oral Daily        sodium chloride, acetaminophen, acetaminophen, aluminum-magnesium hydroxide-simethicone, cloNIDine, hydrALAZINE, labetalol, melatonin, nitroGLYCERIN, ondansetron, polyethylene glycol, prochlorperazine, senna-docusate 8.6-50 mg, simethicone, sodium chloride 0.9%, traMADoL     Radiology:  Echo  · The left ventricle is normal in size with concentric hypertrophy and   mildly decreased systolic function.  · The estimated ejection fraction is 35-40.  · Grade I left ventricular diastolic dysfunction.  · There is mild left ventricular global hypokinesis.  · Normal right ventricular size with normal right ventricular systolic   function.  · Mild left atrial enlargement.  · Mild mitral regurgitation.  · Mild to moderate tricuspid regurgitation.  · Mild pulmonic regurgitation.  · Intermediate central venous pressure (8 mmHg).  · The estimated PA systolic pressure is 63 mmHg.  · There is moderate to severe pulmonary hypertension.             Assessment/Plan:      All diagnosis and differential diagnosis have been reviewed; assessment and plan has been documented; I have personally reviewed the labs and test results that are presently available; I have reviewed the patients  medication list; I have reviewed the consulting providers response and recommendations. I have reviewed or attempted to review medical records based upon their availability.       Christos Benson MD   03/12/2023

## 2023-03-13 ENCOUNTER — ANESTHESIA EVENT (OUTPATIENT)
Dept: ENDOSCOPY | Facility: HOSPITAL | Age: 88
DRG: 280 | End: 2023-03-13
Payer: MEDICARE

## 2023-03-13 ENCOUNTER — ANESTHESIA (OUTPATIENT)
Dept: ENDOSCOPY | Facility: HOSPITAL | Age: 88
DRG: 280 | End: 2023-03-13
Payer: MEDICARE

## 2023-03-13 LAB
ALBUMIN SERPL-MCNC: 3.4 G/DL (ref 3.4–4.8)
ALBUMIN/GLOB SERPL: 1.2 RATIO (ref 1.1–2)
ALP SERPL-CCNC: 54 UNIT/L (ref 40–150)
ALT SERPL-CCNC: 9 UNIT/L (ref 0–55)
AST SERPL-CCNC: 15 UNIT/L (ref 5–34)
BASOPHILS # BLD AUTO: 0.09 X10(3)/MCL (ref 0–0.2)
BASOPHILS NFR BLD AUTO: 0.9 %
BILIRUBIN DIRECT+TOT PNL SERPL-MCNC: 1 MG/DL
BUN SERPL-MCNC: 23.7 MG/DL (ref 9.8–20.1)
CALCIUM SERPL-MCNC: 9.4 MG/DL (ref 8.4–10.2)
CHLORIDE SERPL-SCNC: 104 MMOL/L (ref 98–111)
CO2 SERPL-SCNC: 29 MMOL/L (ref 23–31)
CREAT SERPL-MCNC: 0.88 MG/DL (ref 0.55–1.02)
EOSINOPHIL # BLD AUTO: 0.07 X10(3)/MCL (ref 0–0.9)
EOSINOPHIL NFR BLD AUTO: 0.7 %
ERYTHROCYTE [DISTWIDTH] IN BLOOD BY AUTOMATED COUNT: 16.2 % (ref 11.5–17)
GFR SERPLBLD CREATININE-BSD FMLA CKD-EPI: >60 MLS/MIN/1.73/M2
GLOBULIN SER-MCNC: 2.9 GM/DL (ref 2.4–3.5)
GLUCOSE SERPL-MCNC: 103 MG/DL (ref 75–121)
HCT VFR BLD AUTO: 31.4 % (ref 37–47)
HEMATOLOGIST REVIEW: NORMAL
HGB BLD-MCNC: 9.8 G/DL (ref 12–16)
IMM GRANULOCYTES # BLD AUTO: 0.04 X10(3)/MCL (ref 0–0.04)
IMM GRANULOCYTES NFR BLD AUTO: 0.4 %
LYMPHOCYTES # BLD AUTO: 1.62 X10(3)/MCL (ref 0.6–4.6)
LYMPHOCYTES NFR BLD AUTO: 17.1 %
MAGNESIUM SERPL-MCNC: 2.2 MG/DL (ref 1.6–2.6)
MCH RBC QN AUTO: 24.2 PG
MCHC RBC AUTO-ENTMCNC: 31.2 G/DL (ref 33–36)
MCV RBC AUTO: 77.5 FL (ref 80–94)
MONOCYTES # BLD AUTO: 1.21 X10(3)/MCL (ref 0.1–1.3)
MONOCYTES NFR BLD AUTO: 12.8 %
NEUTROPHILS # BLD AUTO: 6.45 X10(3)/MCL (ref 2.1–9.2)
NEUTROPHILS NFR BLD AUTO: 68.1 %
NRBC BLD AUTO-RTO: 0 %
PLATELET # BLD AUTO: 281 X10(3)/MCL (ref 130–400)
PMV BLD AUTO: 10.3 FL (ref 7.4–10.4)
POTASSIUM SERPL-SCNC: 3.9 MMOL/L (ref 3.5–5.1)
PROT SERPL-MCNC: 6.3 GM/DL (ref 5.8–7.6)
RBC # BLD AUTO: 4.05 X10(6)/MCL (ref 4.2–5.4)
SODIUM SERPL-SCNC: 141 MMOL/L (ref 132–146)
TROPONIN I SERPL-MCNC: 0.08 NG/ML (ref 0–0.04)
WBC # SPEC AUTO: 9.5 X10(3)/MCL (ref 4.5–11.5)

## 2023-03-13 PROCEDURE — 88312 SPECIAL STAINS GROUP 1: CPT

## 2023-03-13 PROCEDURE — 25000003 PHARM REV CODE 250: Performed by: INTERNAL MEDICINE

## 2023-03-13 PROCEDURE — 25000003 PHARM REV CODE 250: Performed by: NURSE PRACTITIONER

## 2023-03-13 PROCEDURE — 83735 ASSAY OF MAGNESIUM: CPT | Performed by: STUDENT IN AN ORGANIZED HEALTH CARE EDUCATION/TRAINING PROGRAM

## 2023-03-13 PROCEDURE — 21400001 HC TELEMETRY ROOM

## 2023-03-13 PROCEDURE — 88313 SPECIAL STAINS GROUP 2: CPT

## 2023-03-13 PROCEDURE — 27201423 OPTIME MED/SURG SUP & DEVICES STERILE SUPPLY: Performed by: INTERNAL MEDICINE

## 2023-03-13 PROCEDURE — 43239 EGD BIOPSY SINGLE/MULTIPLE: CPT | Performed by: INTERNAL MEDICINE

## 2023-03-13 PROCEDURE — 80053 COMPREHEN METABOLIC PANEL: CPT | Performed by: STUDENT IN AN ORGANIZED HEALTH CARE EDUCATION/TRAINING PROGRAM

## 2023-03-13 PROCEDURE — 88305 TISSUE EXAM BY PATHOLOGIST: CPT | Performed by: INTERNAL MEDICINE

## 2023-03-13 PROCEDURE — 25000003 PHARM REV CODE 250: Performed by: ANESTHESIOLOGY

## 2023-03-13 PROCEDURE — 84484 ASSAY OF TROPONIN QUANT: CPT | Performed by: INTERNAL MEDICINE

## 2023-03-13 PROCEDURE — 37000008 HC ANESTHESIA 1ST 15 MINUTES: Performed by: INTERNAL MEDICINE

## 2023-03-13 PROCEDURE — 85025 COMPLETE CBC W/AUTO DIFF WBC: CPT | Performed by: STUDENT IN AN ORGANIZED HEALTH CARE EDUCATION/TRAINING PROGRAM

## 2023-03-13 PROCEDURE — 25000003 PHARM REV CODE 250: Performed by: NURSE ANESTHETIST, CERTIFIED REGISTERED

## 2023-03-13 PROCEDURE — 37000009 HC ANESTHESIA EA ADD 15 MINS: Performed by: INTERNAL MEDICINE

## 2023-03-13 PROCEDURE — 63600175 PHARM REV CODE 636 W HCPCS: Performed by: NURSE ANESTHETIST, CERTIFIED REGISTERED

## 2023-03-13 RX ORDER — LIDOCAINE HYDROCHLORIDE 10 MG/ML
INJECTION, SOLUTION EPIDURAL; INFILTRATION; INTRACAUDAL; PERINEURAL
Status: COMPLETED
Start: 2023-03-13 | End: 2023-03-13

## 2023-03-13 RX ORDER — LIDOCAINE HYDROCHLORIDE 10 MG/ML
1 INJECTION, SOLUTION EPIDURAL; INFILTRATION; INTRACAUDAL; PERINEURAL ONCE
Status: DISCONTINUED | OUTPATIENT
Start: 2023-03-13 | End: 2023-03-15 | Stop reason: HOSPADM

## 2023-03-13 RX ORDER — LIDOCAINE HYDROCHLORIDE 10 MG/ML
INJECTION, SOLUTION EPIDURAL; INFILTRATION; INTRACAUDAL; PERINEURAL
Status: DISCONTINUED | OUTPATIENT
Start: 2023-03-13 | End: 2023-03-13

## 2023-03-13 RX ORDER — ONDANSETRON 2 MG/ML
4 INJECTION INTRAMUSCULAR; INTRAVENOUS DAILY PRN
Status: CANCELLED | OUTPATIENT
Start: 2023-03-13

## 2023-03-13 RX ORDER — PHENYLEPHRINE HCL IN 0.9% NACL 1 MG/10 ML
SYRINGE (ML) INTRAVENOUS
Status: DISCONTINUED
Start: 2023-03-13 | End: 2023-03-13 | Stop reason: WASHOUT

## 2023-03-13 RX ORDER — MEPERIDINE HYDROCHLORIDE 25 MG/ML
12.5 INJECTION INTRAMUSCULAR; INTRAVENOUS; SUBCUTANEOUS EVERY 10 MIN PRN
Status: CANCELLED | OUTPATIENT
Start: 2023-03-13 | End: 2023-03-14

## 2023-03-13 RX ORDER — PROPOFOL 10 MG/ML
VIAL (ML) INTRAVENOUS
Status: COMPLETED
Start: 2023-03-13 | End: 2023-03-13

## 2023-03-13 RX ORDER — LORAZEPAM 2 MG/ML
0.25 INJECTION INTRAMUSCULAR ONCE AS NEEDED
Status: CANCELLED | OUTPATIENT
Start: 2023-03-13 | End: 2034-08-09

## 2023-03-13 RX ORDER — SODIUM CHLORIDE, SODIUM GLUCONATE, SODIUM ACETATE, POTASSIUM CHLORIDE AND MAGNESIUM CHLORIDE 30; 37; 368; 526; 502 MG/100ML; MG/100ML; MG/100ML; MG/100ML; MG/100ML
INJECTION, SOLUTION INTRAVENOUS CONTINUOUS
Status: DISCONTINUED | OUTPATIENT
Start: 2023-03-13 | End: 2023-03-15 | Stop reason: HOSPADM

## 2023-03-13 RX ORDER — ONDANSETRON 4 MG/1
8 TABLET, ORALLY DISINTEGRATING ORAL EVERY 6 HOURS PRN
Status: DISCONTINUED | OUTPATIENT
Start: 2023-03-13 | End: 2023-03-15 | Stop reason: HOSPADM

## 2023-03-13 RX ORDER — PROPOFOL 10 MG/ML
VIAL (ML) INTRAVENOUS
Status: DISCONTINUED | OUTPATIENT
Start: 2023-03-13 | End: 2023-03-13

## 2023-03-13 RX ORDER — IPRATROPIUM BROMIDE AND ALBUTEROL SULFATE 2.5; .5 MG/3ML; MG/3ML
3 SOLUTION RESPIRATORY (INHALATION)
Status: CANCELLED | OUTPATIENT
Start: 2023-03-13

## 2023-03-13 RX ORDER — PROCHLORPERAZINE EDISYLATE 5 MG/ML
5 INJECTION INTRAMUSCULAR; INTRAVENOUS EVERY 30 MIN PRN
Status: CANCELLED | OUTPATIENT
Start: 2023-03-13

## 2023-03-13 RX ADMIN — SODIUM CHLORIDE, SODIUM GLUCONATE, SODIUM ACETATE, POTASSIUM CHLORIDE AND MAGNESIUM CHLORIDE 150 ML: 526; 502; 368; 37; 30 INJECTION, SOLUTION INTRAVENOUS at 09:03

## 2023-03-13 RX ADMIN — SACUBITRIL AND VALSARTAN 1 TABLET: 24; 26 TABLET, FILM COATED ORAL at 11:03

## 2023-03-13 RX ADMIN — SENNOSIDES AND DOCUSATE SODIUM 2 TABLET: 50; 8.6 TABLET ORAL at 05:03

## 2023-03-13 RX ADMIN — PROPOFOL 80 MG: 10 INJECTION, EMULSION INTRAVENOUS at 09:03

## 2023-03-13 RX ADMIN — ISOSORBIDE MONONITRATE 30 MG: 30 TABLET, EXTENDED RELEASE ORAL at 11:03

## 2023-03-13 RX ADMIN — ASPIRIN 81 MG: 81 TABLET, COATED ORAL at 11:03

## 2023-03-13 RX ADMIN — PANTOPRAZOLE SODIUM 40 MG: 40 TABLET, DELAYED RELEASE ORAL at 11:03

## 2023-03-13 RX ADMIN — FOLIC ACID 1 MG: 1 TABLET ORAL at 11:03

## 2023-03-13 RX ADMIN — VERAPAMIL HYDROCHLORIDE 240 MG: 240 TABLET, FILM COATED, EXTENDED RELEASE ORAL at 11:03

## 2023-03-13 RX ADMIN — LIDOCAINE HYDROCHLORIDE 40 MG: 10 INJECTION, SOLUTION EPIDURAL; INFILTRATION; INTRACAUDAL; PERINEURAL at 09:03

## 2023-03-13 RX ADMIN — POLYETHYLENE GLYCOL 3350 17 G: 17 POWDER, FOR SOLUTION ORAL at 11:03

## 2023-03-13 RX ADMIN — ATORVASTATIN CALCIUM 40 MG: 40 TABLET, FILM COATED ORAL at 08:03

## 2023-03-13 RX ADMIN — METOPROLOL SUCCINATE 50 MG: 50 TABLET, EXTENDED RELEASE ORAL at 11:03

## 2023-03-13 RX ADMIN — SACUBITRIL AND VALSARTAN 1 TABLET: 24; 26 TABLET, FILM COATED ORAL at 08:03

## 2023-03-13 RX ADMIN — PROPOFOL 20 MG: 10 INJECTION, EMULSION INTRAVENOUS at 09:03

## 2023-03-13 NOTE — TRANSFER OF CARE
"Anesthesia Transfer of Care Note    Patient: Simin ARIAS Dural    Procedure(s) Performed: Procedure(s) (LRB):  EGD (N/A)  EGD, WITH CLOSED BIOPSY    Patient location: GI    Anesthesia Type: general    Transport from OR: Transported from OR on room air with adequate spontaneous ventilation    Post pain: adequate analgesia    Post vital signs: stable    Level of consciousness: sedated and responds to stimulation    Nausea/Vomiting: no nausea/vomiting    Complications: none    Transfer of care protocol was followed      Last vitals:   Visit Vitals  /73   Pulse 71   Temp 36 °C (96.8 °F)   Resp 18   Ht 5' 1.02" (1.55 m)   Wt 50.4 kg (111 lb 1.8 oz)   SpO2 99%   BMI 20.98 kg/m²     "

## 2023-03-13 NOTE — PLAN OF CARE
Problem: Adult Inpatient Plan of Care  Goal: Plan of Care Review  Outcome: Ongoing, Progressing  Goal: Patient-Specific Goal (Individualized)  Outcome: Ongoing, Progressing  Goal: Absence of Hospital-Acquired Illness or Injury  Outcome: Ongoing, Progressing   Patient AAOx4 with no s/s of acute distress.  On RA.  Denies chest pain.  IV c/d/I.  See urine output.  Nihsa urine.  Teaching to shift weight while in bed.  NPO  EGD.  Safety rounds performed.  All needs addressed.

## 2023-03-13 NOTE — NURSING
Patient Aaox4 with no s/s of acute distress.  Denies pain.  IV c/d/I.  No acute events this shift.  Patient NPO.  Safety rounds performed.  All needs addressed.

## 2023-03-13 NOTE — ANESTHESIA POSTPROCEDURE EVALUATION
Anesthesia Post Evaluation    Patient: Simin ARIAS Dural    Procedure(s) Performed: Procedure(s) (LRB):  EGD (N/A)  EGD, WITH CLOSED BIOPSY    Final Anesthesia Type: general      Patient location during evaluation: floor  Patient participation: Yes- Able to Participate  Level of consciousness: awake and alert  Post-procedure vital signs: reviewed and stable  Pain management: adequate  Airway patency: patent    PONV status at discharge: No PONV  Anesthetic complications: no      Cardiovascular status: blood pressure returned to baseline  Respiratory status: spontaneous ventilation and room air  Hydration status: euvolemic  Follow-up not needed.          Vitals Value Taken Time   /63 03/13/23 1003   Temp 36 °C (96.8 °F) 03/13/23 0959   Pulse 76 03/13/23 1002   Resp 16 03/13/23 1002   SpO2 99 % 03/13/23 1002         No case tracking events are documented in the log.      Pain/Satish Score: Satish Score: 9 (3/13/2023 10:03 AM)

## 2023-03-13 NOTE — PROVATION PATIENT INSTRUCTIONS
Discharge Summary/Instructions after an Endoscopic Procedure  Patient Name: Simin Gudino  Patient MRN: [id number]  Patient YOB: 1931 Monday, March 13, 2023  Anuel Calix MD  Dear patient,  As a result of recent federal legislation (The Federal Cures Act), you may   receive lab or pathology results from your procedure in your [facility]   account before your physician is able to contact you. Your physician or   their representative will relay the results to you with their   recommendations at their soonest availability.  Thank you,  RESTRICTIONS:  During your procedure today, you received medications for sedation.  These   medications may affect your judgment, balance and coordination.  Therefore,   for 24 hours, you have the following restrictions:   - DO NOT drive a car, operate machinery, make legal/financial decisions,   sign important papers or drink alcohol.    ACTIVITY:  Today: no heavy lifting, straining or running due to procedural   sedation/anesthesia.  The following day: return to full activity including work.  DIET:  Eat and drink normally unless instructed otherwise.     TREATMENT FOR COMMON SIDE EFFECTS:  - Mild abdominal pain, nausea, belching, bloating or excessive gas:  rest,   eat lightly and use a heating pad.  - Sore Throat: treat with throat lozenges and/or gargle with warm salt   water.  - Because air was used during the procedure, expelling large amounts of air   from your rectum or belching is normal.  - If a bowel prep was taken, you may not have a bowel movement for 1-3 days.    This is normal.  SYMPTOMS TO WATCH FOR AND REPORT TO YOUR PHYSICIAN:  1. Abdominal pain or bloating, other than gas cramps.  2. Chest pain.  3. Back pain.  4. Signs of infection such as: chills or fever occurring within 24 hours   after the procedure.  5. Rectal bleeding, which would show as bright red, maroon, or black stools.   (A tablespoon of blood from the rectum is not serious, especially if    hemorrhoids are present.)  6. Vomiting.  7. Weakness or dizziness.  GO DIRECTLY TO THE NEAREST EMERGENCY ROOM IF YOU HAVE ANY OF THE FOLLOWING:      Difficulty breathing              Chills and/or fever over 101 F   Persistent vomiting and/or vomiting blood   Severe abdominal pain   Severe chest pain   Black, tarry stools   Bleeding- more than one tablespoon   Any other symptom or condition that you feel may need urgent attention  Your doctor recommends these additional instructions:  If any biopsies were taken, your doctors clinic will contact you in 1 to 2   weeks with any results.  - Return patient to [facility] for ongoing care.   - Resume previous diet today.   - Continue present medications.   - Observe patient's clinical course.   - Await pathology results.   - No further GI interventions planned. Ok to d/c home from GI standpoint   when medically ready.  - The findings and recommendations were discussed with the patient.  For questions, problems or results please call your physician - Anuel Calix MD at Work:  [phone].  [facility] [place], EMERGENCY ROOM PHONE NUMBER: [phone]  IF A COMPLICATION OR EMERGENCY SITUATION ARISES AND YOU ARE UNABLE TO REACH   YOUR PHYSICIAN - GO DIRECTLY TO THE EMERGENCY ROOM.  Anuel Calix MD  3/13/2023 10:05:24 AM  This report has been verified and signed electronically.  Dear patient,  As a result of recent federal legislation (The Federal Cures Act), you may   receive lab or pathology results from your procedure in your [facility]   account before your physician is able to contact you. Your physician or   their representative will relay the results to you with their   recommendations at their soonest availability.  Thank you,  PROVATION

## 2023-03-13 NOTE — PROGRESS NOTES
"Inpatient Nutrition Evaluation    Admit Date: 3/10/2023   Total duration of encounter: 2 days    Nutrition Recommendation/Prescription     Continue regular diet.     Boost Plus TID. 360 kcals and 14 g protein per serving.    Nutrition Assessment     Chart Review    Reason Seen: malnutrition screening tool (MST)    Malnutrition Screening Tool Results   Have you recently lost weight without trying?: Unsure  Have you been eating poorly because of a decreased appetite?: Yes   MST Score: 3     Diagnosis:  ACS/NSTEMI --suspect multivessel disease  HTN urgency  Iron-deficiency anemia  B12 deficiency    Relevant Medical History: HTN    Nutrition-Related Medications: B12, Ferrlecit, Folic acid, Metoprolol     Nutrition-Related Labs:  3/11: H/H: 7.6/25.2     Diet Order: Diet Adult Regular  Diet NPO Except for: Sips with Medication  Oral Supplement Order: none  Appetite/Oral Intake: good/50-75% of meals  Factors Affecting Nutritional Intake: none identified  Food/Zoroastrian/Cultural Preferences: none reported  Food Allergies: none reported       Wound(s):   n/a    Comments    3/11/23: Pt and daughter report good appetite currently, appetite was decreased for 2-3 days, she has lost some weight but not at a rate c/w malnutrition, -130 lbs, now 116 lbs - daughter reports this was over 3 months or longer, no GI complaints, agreeable to chocolate Boost.     Anthropometrics    Height: 5' 1.02" (155 cm)    Last Weight: 52.6 kg (116 lb) (03/11/23 0927) Weight Method: Bed Scale  BMI (Calculated): 21.9  BMI Classification: underweight (BMI less than 22 if >65 years of age)     Ideal Body Weight (IBW), Female: 105.1 lb     % Ideal Body Weight, Female (lb): 110.37 %                             Usual Weight Provided By: patient, family/caregiver, and EMR weight history    Wt Readings from Last 3 Encounters:   03/11/23 0927 52.6 kg (116 lb)   03/10/23 2315 53 kg (116 lb 13.5 oz)      Weight Change(s) Since Admission:  Admit Weight: 53 " kg (116 lb 13.5 oz) (03/10/23 5891)      Patient Education    Not applicable.    Monitoring & Evaluation     Dietitian will monitor food and beverage intake and weight.  Nutrition Risk/Follow-Up: low (follow-up in 5-7 days)  Patients assigned 'low nutrition risk' status do not qualify for a full nutritional assessment but will be monitored and re-evaluated in a 5-7 day time period. Please consult if re-evaluation needed sooner.

## 2023-03-13 NOTE — NURSING
Nurses Note -- 4 Eyes      3/13/2023   3:12 PM      Skin assessed during: Daily Assessment      [x] No Pressure Injuries Present    []Prevention Measures Documented      [] Yes- Altered Skin Integrity Present or Discovered   [] LDA Added if Not in Epic (Describe Wound)   [] New Altered Skin Integrity was Present on Admit and Documented in LDA   [] Wound Image Taken    Wound Care Consulted? No    Attending Nurse:  Adamaris Zamudio RN     Second RN/Staff Member:  Nasreen Gordon

## 2023-03-13 NOTE — PLAN OF CARE
03/13/23 1442   Discharge Assessment   Assessment Type Discharge Planning Assessment   Confirmed/corrected address, phone number and insurance Yes   Confirmed Demographics Correct on Facesheet   Source of Information patient;family   Communicated CORTNEY with patient/caregiver Date not available/Unable to determine   People in Home child(huong), adult   Do you expect to return to your current living situation? Yes   Do you have help at home or someone to help you manage your care at home? Yes   Prior to hospitilization cognitive status: Alert/Oriented   Current cognitive status: Alert/Oriented   Home Accessibility wheelchair accessible   Home Layout Able to live on 1st floor   Equipment Currently Used at Home walker, rolling   Readmission within 30 days? No   Patient currently being followed by outpatient case management? No   Do you currently have service(s) that help you manage your care at home? No   Do you take prescription medications? Yes   Do you have prescription coverage? Yes   Do you have any problems affording any of your prescribed medications? No   Is the patient taking medications as prescribed? yes   Who is going to help you get home at discharge? daughter   How do you get to doctors appointments? family or friend will provide   Are you on dialysis? No   Do you take coumadin? No   Discharge Plan A Home   Discharge Plan B Home with family   DME Needed Upon Discharge  none   Discharge Barriers Identified None

## 2023-03-13 NOTE — PLAN OF CARE
Problem: Adult Inpatient Plan of Care  Goal: Plan of Care Review  Outcome: Ongoing, Progressing  Flowsheets (Taken 3/13/2023 1737)  Plan of Care Reviewed With: patient  Goal: Patient-Specific Goal (Individualized)  Outcome: Ongoing, Progressing  Goal: Absence of Hospital-Acquired Illness or Injury  Outcome: Ongoing, Progressing  Intervention: Identify and Manage Fall Risk  Flowsheets (Taken 3/13/2023 1737)  Safety Promotion/Fall Prevention:   assistive device/personal item within reach   Fall Risk reviewed with patient/family   high risk medications identified   side rails raised x 2  Intervention: Prevent Skin Injury  Flowsheets (Taken 3/13/2023 1737)  Body Position: position changed independently  Skin Protection:   adhesive use limited   incontinence pads utilized  Intervention: Prevent and Manage VTE (Venous Thromboembolism) Risk  Flowsheets (Taken 3/13/2023 1737)  Activity Management: Ambulated in room - L4  VTE Prevention/Management: remove, assess skin, and reapply sequential compression device  Goal: Optimal Comfort and Wellbeing  Outcome: Ongoing, Progressing  Intervention: Provide Person-Centered Care  Flowsheets (Taken 3/13/2023 1737)  Trust Relationship/Rapport:   care explained   choices provided  Goal: Readiness for Transition of Care  Outcome: Ongoing, Progressing     Problem: Skin Injury Risk Increased  Goal: Skin Health and Integrity  Outcome: Ongoing, Progressing  Intervention: Optimize Skin Protection  Flowsheets (Taken 3/13/2023 1737)  Skin Protection:   adhesive use limited   incontinence pads utilized  Head of Bed (HOB) Positioning: HOB at 30-45 degrees     Problem: Fall Injury Risk  Goal: Absence of Fall and Fall-Related Injury  Outcome: Ongoing, Progressing

## 2023-03-13 NOTE — PROGRESS NOTES
Is [facility] Medicine Progress Note        Chief Complaint: Inpatient Follow-up for     Subjective:  89-year-old female with history of hypertension on verapamil and HCTZ present to the ED with complaint of chest pain.  History obtained from patient and her daughter at bedside.  Report her symptom initially started about 2-3 months ago with intermittent episode of left shoulder/arm and left jaw pain that wakes her up from sleep at night and usually resolved by morning, she did not seek any medical attention for that.  This past night she had similar episode of pain but it did not resolve and started having chest pain described as substernal pressure and heaviness.  On arrival she was hypotensive, labs notable for elevated troponin, EKG notable for poor R-wave progression in chest leads.  Pain improved with sublingual nitro on arrival to ED.    Seen and examined the patient.   She did not have any symptoms no bowel movement so far pain   Iron deficiency anemia.    Pending EGD results.    Physical exam:    No JVD, Lungs clear to auscultation,   Normal heart sound with no murmurs, no pedal edema.      Assessment and Plan:  ACS/NSTEMI --suspect multivessel disease  HTN urgency  Iron-deficiency anemia  B12 deficiency     - status post EGD waiting results.  -monitor CBC hemoglobin,   -cardiology is on board.    -EF is 35% with global hypokinesis patient will need a stress test.    Critical care time: 35 min  Critical care diagnosis: ACS      Objective/physical exam:  General: In no acute distress, AAOx3   Chest: Clear to auscultation bilaterally, non-labored   Heart: RRR, +S1, S2, no appreciable murmur  Abdomen: Soft, nontender, BS +  MSK: Warm, no lower extremity edema, no clubbing or cyanosis  Neurologic:  Cranial nerve II-XII intact, Strength 5/5 in all 4 extremities    VITAL SIGNS: 24 HRS MIN & MAX LAST   Temp  Min: 96.8 °F (36 °C)  Max: 98.8 °F (37.1 °C) 97.7 °F (36.5 °C)   BP   Min: 118/63  Max: 170/64 (!) 170/64     Pulse  Min: 65  Max: 77  77   Resp  Min: 16  Max: 18 16   SpO2  Min: 96 %  Max: 99 % 97 %         Labs, Microbiology and Imaging were Reviewed.      Microbiology Results (last 7 days)       ** No results found for the last 168 hours. **               Medications   aspirin  81 mg Oral Daily    atorvastatin  40 mg Oral QHS    cyanocobalamin  1,000 mcg Intramuscular Daily    ferric gluconate (FERRLECIT) IVPB  125 mg Intravenous Daily    folic acid  1 mg Oral Daily    isosorbide mononitrate  30 mg Oral Daily    LIDOcaine (PF) 10 mg/ml (1%)  1 mL Intradermal Once    metoprolol succinate  50 mg Oral Daily    pantoprazole  40 mg Oral Daily    sacubitriL-valsartan  1 tablet Oral BID    verapamiL  240 mg Oral Daily        sodium chloride, acetaminophen, acetaminophen, aluminum-magnesium hydroxide-simethicone, cloNIDine, hydrALAZINE, labetalol, melatonin, nitroGLYCERIN, ondansetron, ondansetron, polyethylene glycol, prochlorperazine, senna-docusate 8.6-50 mg, simethicone, sodium chloride 0.9%, traMADoL     Radiology:  Echo  · The left ventricle is normal in size with concentric hypertrophy and   mildly decreased systolic function.  · The estimated ejection fraction is 35-40.  · Grade I left ventricular diastolic dysfunction.  · There is mild left ventricular global hypokinesis.  · Normal right ventricular size with normal right ventricular systolic   function.  · Mild left atrial enlargement.  · Mild mitral regurgitation.  · Mild to moderate tricuspid regurgitation.  · Mild pulmonic regurgitation.  · Intermediate central venous pressure (8 mmHg).  · The estimated PA systolic pressure is 63 mmHg.  · There is moderate to severe pulmonary hypertension.             Assessment/Plan:      All diagnosis and differential diagnosis have been reviewed; assessment and plan has been documented; I have personally reviewed the labs and test results that are presently available; I have reviewed the  patients medication list; I have reviewed the consulting providers response and recommendations. I have reviewed or attempted to review medical records based upon their availability.       Christos Benson MD   03/13/2023

## 2023-03-13 NOTE — ANESTHESIA PREPROCEDURE EVALUATION
03/13/2023  Simin Gudino is a 89 y.o., female admitted March 10th with chest pain and mildly elevated troponins and elevated BNP (EF 35% with pulmonary hypertension) with moderate anemia that worsened after admission requiring transfusion.  Patient presents today for EGD prior to invasive cardiac workup.    2D echo March 10, 2023   EF 35% with grade 1 diastolic dysfunction  Mild MR/PI , mild-to-moderate TR   PA systolic pressure 63          Pre-op Assessment    I have reviewed the Patient Summary Reports.    I have reviewed the NPO Status.   I have reviewed the Medications.     Review of Systems  Anesthesia Hx:   Denies Personal Hx of Anesthesia complications.   Social:  Non-Smoker    Cardiovascular:   Hypertension  Functional Capacity 2 METS  Coronary Artery Disease: Hx of Myocardial Infarction, MI is acute = within 1 week        Physical Exam  General: Well nourished, Cooperative, Alert and Oriented    Airway:  Mallampati: II   Mouth Opening: Normal  TM Distance: Normal  Tongue: Normal  Neck ROM: Normal ROM    Dental:  Intact, Edentulous    Chest/Lungs:  Clear to auscultation, Normal Respiratory Rate    Heart:  Rate: Normal  Rhythm: Regular Rhythm        Anesthesia Plan  Type of Anesthesia, risks & benefits discussed:    Anesthesia Type: Gen Natural Airway  Intra-op Monitoring Plan: Standard ASA Monitors  Induction:  IV  Informed Consent: Informed consent signed with the Patient and all parties understand the risks and agree with anesthesia plan.  All questions answered.   ASA Score: 4  Day of Surgery Review of History & Physical: H&P Update referred to the surgeon/provider.    Ready For Surgery From Anesthesia Perspective.     .

## 2023-03-14 LAB — PSYCHE PATHOLOGY RESULT: NORMAL

## 2023-03-14 PROCEDURE — 21400001 HC TELEMETRY ROOM

## 2023-03-14 PROCEDURE — 25000003 PHARM REV CODE 250: Performed by: INTERNAL MEDICINE

## 2023-03-14 PROCEDURE — 63600175 PHARM REV CODE 636 W HCPCS: Performed by: INTERNAL MEDICINE

## 2023-03-14 PROCEDURE — 25000003 PHARM REV CODE 250: Performed by: NURSE PRACTITIONER

## 2023-03-14 RX ADMIN — SACUBITRIL AND VALSARTAN 1 TABLET: 24; 26 TABLET, FILM COATED ORAL at 08:03

## 2023-03-14 RX ADMIN — POLYETHYLENE GLYCOL 3350 17 G: 17 POWDER, FOR SOLUTION ORAL at 03:03

## 2023-03-14 RX ADMIN — ATORVASTATIN CALCIUM 40 MG: 40 TABLET, FILM COATED ORAL at 08:03

## 2023-03-14 RX ADMIN — METOPROLOL SUCCINATE 50 MG: 50 TABLET, EXTENDED RELEASE ORAL at 10:03

## 2023-03-14 RX ADMIN — CYANOCOBALAMIN 1000 MCG: 1000 INJECTION, SOLUTION INTRAMUSCULAR; SUBCUTANEOUS at 10:03

## 2023-03-14 RX ADMIN — ISOSORBIDE MONONITRATE 30 MG: 30 TABLET, EXTENDED RELEASE ORAL at 10:03

## 2023-03-14 RX ADMIN — ASPIRIN 81 MG: 81 TABLET, COATED ORAL at 10:03

## 2023-03-14 RX ADMIN — SENNOSIDES AND DOCUSATE SODIUM 2 TABLET: 50; 8.6 TABLET ORAL at 03:03

## 2023-03-14 RX ADMIN — SODIUM CHLORIDE 125 MG: 9 INJECTION, SOLUTION INTRAVENOUS at 10:03

## 2023-03-14 RX ADMIN — SACUBITRIL AND VALSARTAN 1 TABLET: 24; 26 TABLET, FILM COATED ORAL at 10:03

## 2023-03-14 RX ADMIN — VERAPAMIL HYDROCHLORIDE 240 MG: 240 TABLET, FILM COATED, EXTENDED RELEASE ORAL at 10:03

## 2023-03-14 RX ADMIN — FOLIC ACID 1 MG: 1 TABLET ORAL at 10:03

## 2023-03-14 RX ADMIN — SIMETHICONE 80 MG: 80 TABLET, CHEWABLE ORAL at 08:03

## 2023-03-14 RX ADMIN — PANTOPRAZOLE SODIUM 40 MG: 40 TABLET, DELAYED RELEASE ORAL at 10:03

## 2023-03-14 NOTE — PROGRESS NOTES
Is [facility] Medicine Progress Note        Chief Complaint: Inpatient Follow-up for     Subjective:  89-year-old female with history of hypertension on verapamil and HCTZ present to the ED with complaint of chest pain.  History obtained from patient and her daughter at bedside.  Report her symptom initially started about 2-3 months ago with intermittent episode of left shoulder/arm and left jaw pain that wakes her up from sleep at night and usually resolved by morning, she did not seek any medical attention for that.  This past night she had similar episode of pain but it did not resolve and started having chest pain described as substernal pressure and heaviness.  On arrival she was hypotensive, labs notable for elevated troponin, EKG notable for poor R-wave progression in chest leads.  Pain improved with sublingual nitro on arrival to ED.    Seen and examined the patient.   She did not have any symptoms no bowel movement so far pain   Iron deficiency anemia.      Physical exam:    No JVD, Lungs clear to auscultation,   Normal heart sound with no murmurs, no pedal edema.      Assessment and Plan:  ACS/NSTEMI --suspect multivessel disease  HTN urgency  Iron-deficiency anemia  B12 deficiency     -EGD is negative, GI recommending o further procedures.   -Monitor CBC hemoglobin,   -Cardiology is on board.    -EF is 35% with global hypokinesis patient will need a stress test.      Objective/physical exam:  General: In no acute distress, AAOx3   Chest: Clear to auscultation bilaterally, non-labored   Heart: RRR, +S1, S2, no appreciable murmur  Abdomen: Soft, nontender, BS +  MSK: Warm, no lower extremity edema, no clubbing or cyanosis  Neurologic:  Cranial nerve II-XII intact, Strength 5/5 in all 4 extremities    VITAL SIGNS: 24 HRS MIN & MAX LAST   Temp  Min: 97.6 °F (36.4 °C)  Max: 99.2 °F (37.3 °C) 97.6 °F (36.4 °C)   BP  Min: 123/50  Max: 148/67 (!) 148/67     Pulse  Min: 69   Max: 76  76   Resp  Min: 16  Max: 23 (!) 23   SpO2  Min: 94 %  Max: 97 % 97 %         Labs, Microbiology and Imaging were Reviewed.      Microbiology Results (last 7 days)       ** No results found for the last 168 hours. **               Medications   aspirin  81 mg Oral Daily    atorvastatin  40 mg Oral QHS    cyanocobalamin  1,000 mcg Intramuscular Daily    ferric gluconate (FERRLECIT) IVPB  125 mg Intravenous Daily    folic acid  1 mg Oral Daily    isosorbide mononitrate  30 mg Oral Daily    LIDOcaine (PF) 10 mg/ml (1%)  1 mL Intradermal Once    metoprolol succinate  50 mg Oral Daily    pantoprazole  40 mg Oral Daily    sacubitriL-valsartan  1 tablet Oral BID    verapamiL  240 mg Oral Daily        sodium chloride, acetaminophen, acetaminophen, aluminum-magnesium hydroxide-simethicone, cloNIDine, hydrALAZINE, labetalol, melatonin, nitroGLYCERIN, ondansetron, ondansetron, polyethylene glycol, prochlorperazine, senna-docusate 8.6-50 mg, simethicone, sodium chloride 0.9%, traMADoL     Radiology:  Echo  · The left ventricle is normal in size with concentric hypertrophy and   mildly decreased systolic function.  · The estimated ejection fraction is 35-40.  · Grade I left ventricular diastolic dysfunction.  · There is mild left ventricular global hypokinesis.  · Normal right ventricular size with normal right ventricular systolic   function.  · Mild left atrial enlargement.  · Mild mitral regurgitation.  · Mild to moderate tricuspid regurgitation.  · Mild pulmonic regurgitation.  · Intermediate central venous pressure (8 mmHg).  · The estimated PA systolic pressure is 63 mmHg.  · There is moderate to severe pulmonary hypertension.         All diagnosis and differential diagnosis have been reviewed; assessment and plan has been documented; I have personally reviewed the labs and test results that are presently available; I have reviewed the patients medication list; I have reviewed the consulting providers response and  recommendations. I have reviewed or attempted to review medical records based upon their availability.       Christos Benson MD   03/14/2023

## 2023-03-15 VITALS
WEIGHT: 111.13 LBS | HEIGHT: 61 IN | OXYGEN SATURATION: 99 % | BODY MASS INDEX: 20.98 KG/M2 | HEART RATE: 78 BPM | DIASTOLIC BLOOD PRESSURE: 72 MMHG | RESPIRATION RATE: 19 BRPM | SYSTOLIC BLOOD PRESSURE: 174 MMHG | TEMPERATURE: 98 F

## 2023-03-15 LAB
ALBUMIN SERPL-MCNC: 3.3 G/DL (ref 3.4–4.8)
ALBUMIN/GLOB SERPL: 1.1 RATIO (ref 1.1–2)
ALP SERPL-CCNC: 52 UNIT/L (ref 40–150)
ALT SERPL-CCNC: 9 UNIT/L (ref 0–55)
AST SERPL-CCNC: 12 UNIT/L (ref 5–34)
BASOPHILS # BLD AUTO: 0.06 X10(3)/MCL (ref 0–0.2)
BASOPHILS NFR BLD AUTO: 0.8 %
BILIRUBIN DIRECT+TOT PNL SERPL-MCNC: 0.7 MG/DL
BUN SERPL-MCNC: 16.2 MG/DL (ref 9.8–20.1)
CALCIUM SERPL-MCNC: 9.4 MG/DL (ref 8.4–10.2)
CHLORIDE SERPL-SCNC: 107 MMOL/L (ref 98–111)
CO2 SERPL-SCNC: 27 MMOL/L (ref 23–31)
CREAT SERPL-MCNC: 0.75 MG/DL (ref 0.55–1.02)
EOSINOPHIL # BLD AUTO: 0.16 X10(3)/MCL (ref 0–0.9)
EOSINOPHIL NFR BLD AUTO: 2.1 %
ERYTHROCYTE [DISTWIDTH] IN BLOOD BY AUTOMATED COUNT: 17.4 % (ref 11.5–17)
GFR SERPLBLD CREATININE-BSD FMLA CKD-EPI: >60 MLS/MIN/1.73/M2
GLOBULIN SER-MCNC: 2.9 GM/DL (ref 2.4–3.5)
GLUCOSE SERPL-MCNC: 91 MG/DL (ref 75–121)
HCT VFR BLD AUTO: 32.1 % (ref 37–47)
HGB BLD-MCNC: 9.8 G/DL (ref 12–16)
IMM GRANULOCYTES # BLD AUTO: 0.02 X10(3)/MCL (ref 0–0.04)
IMM GRANULOCYTES NFR BLD AUTO: 0.3 %
LYMPHOCYTES # BLD AUTO: 1.5 X10(3)/MCL (ref 0.6–4.6)
LYMPHOCYTES NFR BLD AUTO: 19.5 %
MCH RBC QN AUTO: 24 PG
MCHC RBC AUTO-ENTMCNC: 30.5 G/DL (ref 33–36)
MCV RBC AUTO: 78.7 FL (ref 80–94)
MONOCYTES # BLD AUTO: 1.02 X10(3)/MCL (ref 0.1–1.3)
MONOCYTES NFR BLD AUTO: 13.3 %
NEUTROPHILS # BLD AUTO: 4.92 X10(3)/MCL (ref 2.1–9.2)
NEUTROPHILS NFR BLD AUTO: 64 %
NRBC BLD AUTO-RTO: 0 %
PLATELET # BLD AUTO: 260 X10(3)/MCL (ref 130–400)
PMV BLD AUTO: 10.8 FL (ref 7.4–10.4)
POTASSIUM SERPL-SCNC: 4 MMOL/L (ref 3.5–5.1)
PROT SERPL-MCNC: 6.2 GM/DL (ref 5.8–7.6)
RBC # BLD AUTO: 4.08 X10(6)/MCL (ref 4.2–5.4)
SODIUM SERPL-SCNC: 145 MMOL/L (ref 132–146)
WBC # SPEC AUTO: 7.7 X10(3)/MCL (ref 4.5–11.5)

## 2023-03-15 PROCEDURE — 25000003 PHARM REV CODE 250: Performed by: INTERNAL MEDICINE

## 2023-03-15 PROCEDURE — 63600175 PHARM REV CODE 636 W HCPCS: Performed by: INTERNAL MEDICINE

## 2023-03-15 PROCEDURE — 80053 COMPREHEN METABOLIC PANEL: CPT | Performed by: STUDENT IN AN ORGANIZED HEALTH CARE EDUCATION/TRAINING PROGRAM

## 2023-03-15 PROCEDURE — 25000003 PHARM REV CODE 250: Performed by: NURSE PRACTITIONER

## 2023-03-15 PROCEDURE — 85025 COMPLETE CBC W/AUTO DIFF WBC: CPT | Performed by: STUDENT IN AN ORGANIZED HEALTH CARE EDUCATION/TRAINING PROGRAM

## 2023-03-15 PROCEDURE — 97161 PT EVAL LOW COMPLEX 20 MIN: CPT

## 2023-03-15 RX ORDER — METOPROLOL SUCCINATE 50 MG/1
50 TABLET, EXTENDED RELEASE ORAL DAILY
Qty: 30 TABLET | Refills: 11 | Status: SHIPPED | OUTPATIENT
Start: 2023-03-16 | End: 2024-03-15

## 2023-03-15 RX ORDER — ATORVASTATIN CALCIUM 40 MG/1
40 TABLET, FILM COATED ORAL NIGHTLY
Qty: 90 TABLET | Refills: 3 | Status: SHIPPED | OUTPATIENT
Start: 2023-03-15 | End: 2024-03-14

## 2023-03-15 RX ORDER — ISOSORBIDE MONONITRATE 30 MG/1
30 TABLET, EXTENDED RELEASE ORAL DAILY
Qty: 30 TABLET | Refills: 11 | Status: SHIPPED | OUTPATIENT
Start: 2023-03-16 | End: 2024-03-15

## 2023-03-15 RX ADMIN — SACUBITRIL AND VALSARTAN 1 TABLET: 24; 26 TABLET, FILM COATED ORAL at 09:03

## 2023-03-15 RX ADMIN — ASPIRIN 81 MG: 81 TABLET, COATED ORAL at 09:03

## 2023-03-15 RX ADMIN — SODIUM CHLORIDE 125 MG: 9 INJECTION, SOLUTION INTRAVENOUS at 09:03

## 2023-03-15 RX ADMIN — FOLIC ACID 1 MG: 1 TABLET ORAL at 09:03

## 2023-03-15 RX ADMIN — PANTOPRAZOLE SODIUM 40 MG: 40 TABLET, DELAYED RELEASE ORAL at 09:03

## 2023-03-15 RX ADMIN — ISOSORBIDE MONONITRATE 30 MG: 30 TABLET, EXTENDED RELEASE ORAL at 09:03

## 2023-03-15 RX ADMIN — CYANOCOBALAMIN 1000 MCG: 1000 INJECTION, SOLUTION INTRAMUSCULAR; SUBCUTANEOUS at 09:03

## 2023-03-15 RX ADMIN — METOPROLOL SUCCINATE 50 MG: 50 TABLET, EXTENDED RELEASE ORAL at 09:03

## 2023-03-15 RX ADMIN — VERAPAMIL HYDROCHLORIDE 240 MG: 240 TABLET, FILM COATED, EXTENDED RELEASE ORAL at 09:03

## 2023-03-15 NOTE — NURSING
Educated patient about discharge instructions including medications and follow up. Daughter was present during education. Verbalized understanding. Going home with Home Health. Son driving patient home. Patient is stable upon discharge.

## 2023-03-15 NOTE — DISCHARGE INSTRUCTIONS
Educated patient about discharge instructions including medications and follow up. Patient verbalized understanding. Daughter is present during education.

## 2023-03-15 NOTE — PT/OT/SLP EVAL
Physical Therapy Evaluation and Discharge Note    Patient Name:  Simin Gudino   MRN:  [id number]    Recommendations:     Discharge Recommendations: home  Discharge Equipment Recommendations: none   Barriers to discharge: None    Assessment:     Simin Gudino is a 89 y.o. female admitted with a medical diagnosis of NSTEMI (non-ST elevated myocardial infarction). .  At this time, patient is functioning at their prior level of function and does not require further acute PT services.     Recent Surgery: Procedure(s) (LRB):  EGD (N/A)  EGD, WITH CLOSED BIOPSY 2 Days Post-Op    Plan:     During this hospitalization, patient does not require further acute PT services.  Please re-consult if situation changes.      Subjective     Chief Complaint: none  Patient/Family Comments/goals: none  Pain/Comfort:  Pain Rating 1: 0/10    Patients cultural, spiritual, L[employer]-day conflicts given the current situation: no    Living Environment:  Lives with son in [facility].  Prior to admission, patients level of function was independent.  Equipment used at home:  (Owns RW, but does not use it).  DME owned (not currently used): rolling walker.  Upon discharge, patient will have assistance from family.    Objective:     Communicated with nurse prior to session.  Patient found HOB elevated with telemetry upon PT entry to room.    General Precautions: Standard,      Orthopedic Precautions:N/A   Braces: N/A  Respiratory Status: Room air    Exams:  Cognitive Exam:  Patient is oriented to Person, Place, Time, and Situation  Sensation:    -       Intact  RLE ROM: WFL  RLE Strength: WFL  LLE ROM: WFL  LLE Strength: WFL    Functional Mobility:  Bed Mobility:     Supine to Sit: independence  Transfers:     Sit to Stand:  independence with no AD  Gait: 385 ft independently  Balance: good    AM-PAC 6 CLICK MOBILITY  Total Score:24     AM-PAC 6 CLICK MOBILITY  Total Score:24     Patient left up in chair with all lines intact, call button in reach, and  daughter present.    GOALS:   Multidisciplinary Problems       Physical Therapy Goals       Not on file                    History:     History reviewed. No pertinent past medical history.    Past Surgical History:   Procedure Laterality Date    EGD, WITH CLOSED BIOPSY  3/13/2023    Procedure: EGD, WITH CLOSED BIOPSY;  Surgeon: Anuel Calix MD;  Location: [facility] ENDOSCOPY;  Service: Gastroenterology;;    ESOPHAGOGASTRODUODENOSCOPY N/A 3/13/2023    Procedure: EGD;  Surgeon: Anuel Calix MD;  Location: [facility] ENDOSCOPY;  Service: Gastroenterology;  Laterality: N/A;       Time Tracking:     PT Received On: 03/15/23  PT Start Time: 0730     PT Stop Time: 0742  PT Total Time (min): 12 min     Billable Minutes: Evaluation 12 minutes      03/15/2023

## 2023-03-15 NOTE — PLAN OF CARE
03/15/23 0944   Final Note   Assessment Type Final Discharge Note   Anticipated Discharge Disposition Home-Health   Hospital Resources/Appts/Education Provided Post-Acute resouces added to AVS   Post-Acute Status   Post-Acute Authorization Home Health   Home Health Status Referrals Sent  ([facility])

## 2023-03-15 NOTE — DISCHARGE SUMMARY
[facility] Hospital Medicine Discharge Summary    Admit Date: 3/10/2023  Discharge Date and Time: 3/15/2023  Discharging Physician: Christos Benson MD.  Consults: Gastroenterology    Discharge Diagnoses:    Assessment and Plan:  ACS/NSTEMI --suspect multivessel disease  HTN urgency  Iron-deficiency anemia  B12 deficiency     -EGD is negative, GI recommending o further procedures.   -EF is 35% with global hypokinesis patient will need a stress test.    Hospital Course:   89-year-old female with history of hypertension on verapamil and HCTZ present to the ED with complaint of chest pain.  History obtained from patient and her daughter at bedside.  Report her symptom initially started about 2-3 months ago with intermittent episode of left shoulder/arm and left jaw pain that wakes her up from sleep at night and usually resolved by morning, she did not seek any medical attention for that.  This past night she had similar episode of pain but it did not resolve and started having chest pain described as substernal pressure and heaviness.  On arrival she was hypotensive, labs notable for elevated troponin, EKG notable for poor R-wave progression in chest leads.  Pain improved with sublingual nitro on arrival to ED.  Seen and examined the patient.  Afebrile vitals stable hemodynamics stable.    Patient did very well with the therapy and skilled for home.    She needs follow-up from Cardiology and Gastroenterology in discharge.     Objective/physical exam:  General: In no acute distress, AAOx3   Chest: Clear to auscultation bilaterally, non-labored   Heart: RRR, +S1, S2, no appreciable murmur  Abdomen: Soft, nontender, BS +  MSK: Warm, no lower extremity edema, no clubbing or cyanosis  Neurologic:  Cranial nerve II-XII intact, Strength 5/5 in all 4 extremities    Vitals:  VITAL SIGNS: 24 HRS MIN & MAX LAST   Temp  Min: 97.8 °F (36.6 °C)  Max: 98.4 °F (36.9 °C) 98.3 °F (36.8 °C)   BP  Min: 151/66   Max: 174/72 (!) 174/72     Pulse  Min: 78  Max: 85  78   Resp  Min: 16  Max: 19 19   SpO2  Min: 95 %  Max: 99 % 99 %     Procedures Performed: No admission procedures for hospital encounter.     Significant Diagnostic Studies: See Full reports for all details    Recent Labs   Lab 03/12/23  1310 03/13/23  0504 03/15/23  0340   WBC 10.2 9.5 7.7   RBC 3.82* 4.05* 4.08*   HGB 9.3* 9.8* 9.8*   HCT 29.8* 31.4* 32.1*   MCV 78.0* 77.5* 78.7*   MCH 24.3 24.2 24.0   MCHC 31.2* 31.2* 30.5*   RDW 16.1 16.2 17.4*    281 260   MPV 9.9 10.3 10.8*       Recent Labs   Lab 03/11/23  0508 03/12/23  1310 03/13/23  0504 03/15/23  0340    142 141 145   K 4.3 2.8* 3.9 4.0   CO2 19* 22* 29 27   BUN 15.2 18.6 23.7* 16.2   CREATININE 0.73 0.89 0.88 0.75   CALCIUM 9.0 9.3 9.4 9.4   MG 2.10  --  2.20  --    ALBUMIN 3.7 3.6 3.4 3.3*   ALKPHOS 46 49 54 52   ALT 11 10 9 9   AST 34 16 15 12   BILITOT 0.7 1.9* 1.0 0.7        Microbiology Results (last 7 days)       ** No results found for the last 168 hours. **             Echo  · The left ventricle is normal in size with concentric hypertrophy and   mildly decreased systolic function.  · The estimated ejection fraction is 35-40.  · Grade I left ventricular diastolic dysfunction.  · There is mild left ventricular global hypokinesis.  · Normal right ventricular size with normal right ventricular systolic   function.  · Mild left atrial enlargement.  · Mild mitral regurgitation.  · Mild to moderate tricuspid regurgitation.  · Mild pulmonic regurgitation.  · Intermediate central venous pressure (8 mmHg).  · The estimated PA systolic pressure is 63 mmHg.  · There is moderate to severe pulmonary hypertension.            Medication List        START taking these medications      atorvastatin 40 MG tablet  Commonly known as: LIPITOR  Take 1 tablet (40 mg total) by mouth every evening.     isosorbide mononitrate 30 MG 24 hr tablet  Commonly known as: IMDUR  Take 1 tablet (30 mg total) by mouth  once daily.  Start taking on: March 16, 2023     metoprolol succinate 50 MG 24 hr tablet  Commonly known as: TOPROL-XL  Take 1 tablet (50 mg total) by mouth once daily.  Start taking on: March 16, 2023     sacubitriL-valsartan 24-26 mg per tablet  Commonly known as: ENTRESTO  Take 1 tablet by mouth 2 (two) times daily.            CONTINUE taking these medications      aspirin 81 MG Chew     lansoprazole 30 MG capsule  Commonly known as: PREVACID     verapamiL 240 MG CR tablet  Commonly known as: CALAN-SR            STOP taking these medications      hydroCHLOROthiazide 25 MG tablet  Commonly known as: HYDRODIURIL               Where to Get Your Medications        These medications were sent to [facility] - [place] - [address]  [address], [place] [address]      Phone: [phone]   atorvastatin 40 MG tablet  isosorbide mononitrate 30 MG 24 hr tablet  metoprolol succinate 50 MG 24 hr tablet  sacubitriL-valsartan 24-26 mg per tablet          Explained in detail to the patient about the discharge plan, medications, and follow-up visits. Pt understands and agrees with the treatment plan  Discharge Disposition: Home-Health Care [facility]   Discharged Condition: stable  Diet-   Dietary Orders (From admission, onward)       Start     Ordered    03/13/23 1141  Diet Adult Regular  Diet effective now         03/13/23 1141    03/11/23 2019  Dietary nutrition supplements Boost Plus Chocolate; TID  Continuous        Question Answer Comment   Select PO Supplement: Boost Plus Chocolate    Frequency: TID        03/11/23 2018                   Medications Per DC med rec  Activities as tolerated   Contact information for follow-up providers       Omi Tiwari MD Follow up in 1 week(s).    Specialty: Cardiology  Contact information:  [address] [facility].  [place] [address] [phone]                       Contact information for after-discharge care        Home Medical Care       [facility] .    Service: Home Health Services  Contact information:  [address]  [facility] [address]  [phone]                                 For further questions contact hospitalist office    Discharge time 33 minutes    For worsening symptoms, chest pain, shortness of breath, increased abdominal pain, high grade fever, stroke or stroke like symptoms, immediately go to the nearest Emergency Room or call 911 as soon as possible.      Christos Patiño M.D, on 3/15/2023. at 1:37 PM.

## 2023-06-12 PROBLEM — I21.4 NSTEMI (NON-ST ELEVATED MYOCARDIAL INFARCTION): Status: RESOLVED | Noted: 2023-03-10 | Resolved: 2023-06-12

## 2024-09-08 ENCOUNTER — HOSPITAL ENCOUNTER (EMERGENCY)
Facility: HOSPITAL | Age: 89
Discharge: HOME | End: 2024-09-08
Attending: EMERGENCY MEDICINE
Payer: MEDICARE

## 2024-09-08 VITALS
RESPIRATION RATE: 20 BRPM | BODY MASS INDEX: 22.5 KG/M2 | SYSTOLIC BLOOD PRESSURE: 181 MMHG | DIASTOLIC BLOOD PRESSURE: 78 MMHG | WEIGHT: 127 LBS | HEIGHT: 63 IN | HEART RATE: 76 BPM | OXYGEN SATURATION: 100 % | TEMPERATURE: 98 F

## 2024-09-08 DIAGNOSIS — K59.00 CONSTIPATION, UNSPECIFIED CONSTIPATION TYPE: Primary | ICD-10-CM

## 2024-09-08 DIAGNOSIS — K59.00 CONSTIPATION: ICD-10-CM

## 2024-09-08 LAB
ALBUMIN SERPL-MCNC: 3.7 G/DL (ref 3.4–4.8)
ALBUMIN/GLOB SERPL: 1.1 RATIO (ref 1.1–2)
ALP SERPL-CCNC: 89 UNIT/L (ref 40–150)
ALT SERPL-CCNC: 16 UNIT/L (ref 0–55)
ANION GAP SERPL CALC-SCNC: 8 MEQ/L
AST SERPL-CCNC: 25 UNIT/L (ref 5–34)
BACTERIA #/AREA URNS AUTO: ABNORMAL /HPF
BASOPHILS # BLD AUTO: 0.07 X10(3)/MCL
BASOPHILS NFR BLD AUTO: 1.2 %
BILIRUB SERPL-MCNC: 0.5 MG/DL
BILIRUB UR QL STRIP.AUTO: NEGATIVE
BUN SERPL-MCNC: 27.2 MG/DL (ref 9.8–20.1)
CALCIUM SERPL-MCNC: 9.3 MG/DL (ref 8.4–10.2)
CHLORIDE SERPL-SCNC: 111 MMOL/L (ref 98–111)
CLARITY UR: CLEAR
CO2 SERPL-SCNC: 23 MMOL/L (ref 23–31)
COLOR UR AUTO: ABNORMAL
CREAT SERPL-MCNC: 1.2 MG/DL (ref 0.55–1.02)
CREAT/UREA NIT SERPL: 23
EOSINOPHIL # BLD AUTO: 0.18 X10(3)/MCL (ref 0–0.9)
EOSINOPHIL NFR BLD AUTO: 3.2 %
ERYTHROCYTE [DISTWIDTH] IN BLOOD BY AUTOMATED COUNT: 13.2 % (ref 11.5–17)
GFR SERPLBLD CREATININE-BSD FMLA CKD-EPI: 42 ML/MIN/1.73/M2
GLOBULIN SER-MCNC: 3.3 GM/DL (ref 2.4–3.5)
GLUCOSE SERPL-MCNC: 93 MG/DL (ref 75–121)
GLUCOSE UR QL STRIP: NORMAL
HCT VFR BLD AUTO: 34.2 % (ref 37–47)
HGB BLD-MCNC: 11 G/DL (ref 12–16)
HGB UR QL STRIP: NEGATIVE
HYALINE CASTS #/AREA URNS LPF: ABNORMAL /LPF
IMM GRANULOCYTES # BLD AUTO: 0.02 X10(3)/MCL (ref 0–0.04)
IMM GRANULOCYTES NFR BLD AUTO: 0.4 %
KETONES UR QL STRIP: NEGATIVE
LEUKOCYTE ESTERASE UR QL STRIP: NEGATIVE
LYMPHOCYTES # BLD AUTO: 1.78 X10(3)/MCL (ref 0.6–4.6)
LYMPHOCYTES NFR BLD AUTO: 31.3 %
MCH RBC QN AUTO: 29.2 PG (ref 27–31)
MCHC RBC AUTO-ENTMCNC: 32.2 G/DL (ref 33–36)
MCV RBC AUTO: 90.7 FL (ref 80–94)
MONOCYTES # BLD AUTO: 0.61 X10(3)/MCL (ref 0.1–1.3)
MONOCYTES NFR BLD AUTO: 10.7 %
MUCOUS THREADS URNS QL MICRO: ABNORMAL /LPF
NEUTROPHILS # BLD AUTO: 3.02 X10(3)/MCL (ref 2.1–9.2)
NEUTROPHILS NFR BLD AUTO: 53.2 %
NITRITE UR QL STRIP: NEGATIVE
NRBC BLD AUTO-RTO: 0 %
PH UR STRIP: 5.5 [PH]
PLATELET # BLD AUTO: 217 X10(3)/MCL (ref 130–400)
PMV BLD AUTO: 10.2 FL (ref 7.4–10.4)
POTASSIUM SERPL-SCNC: 5.1 MMOL/L (ref 3.5–5.1)
PROT SERPL-MCNC: 7 GM/DL (ref 5.8–7.6)
PROT UR QL STRIP: NEGATIVE
RBC # BLD AUTO: 3.77 X10(6)/MCL (ref 4.2–5.4)
RBC #/AREA URNS AUTO: ABNORMAL /HPF
SODIUM SERPL-SCNC: 142 MMOL/L (ref 136–145)
SP GR UR STRIP.AUTO: 1.02 (ref 1–1.03)
SQUAMOUS #/AREA URNS LPF: ABNORMAL /HPF
UROBILINOGEN UR STRIP-ACNC: NORMAL
WBC # BLD AUTO: 5.68 X10(3)/MCL (ref 4.5–11.5)
WBC #/AREA URNS AUTO: ABNORMAL /HPF

## 2024-09-08 PROCEDURE — 80053 COMPREHEN METABOLIC PANEL: CPT | Performed by: PHYSICIAN ASSISTANT

## 2024-09-08 PROCEDURE — 85025 COMPLETE CBC W/AUTO DIFF WBC: CPT | Performed by: PHYSICIAN ASSISTANT

## 2024-09-08 PROCEDURE — 99284 EMERGENCY DEPT VISIT MOD MDM: CPT | Mod: 25

## 2024-09-08 PROCEDURE — 81001 URINALYSIS AUTO W/SCOPE: CPT | Performed by: PHYSICIAN ASSISTANT

## 2024-09-08 RX ORDER — POLYETHYLENE GLYCOL 3350 17 G/17G
17 POWDER, FOR SOLUTION ORAL DAILY
Qty: 34 G | Refills: 0 | Status: SHIPPED | OUTPATIENT
Start: 2024-09-08 | End: 2024-09-10

## 2024-09-08 NOTE — ED PROVIDER NOTES
Encounter Date: 9/8/2024    SCRIBE #1 NOTE: I, Alisha Gallagher, am scribing for, and in the presence of,  Sunday Benoit III, MD. I have scribed the following portions of the note - Other sections scribed: HPI, ROS, PE.       History     Chief Complaint   Patient presents with    Constipation     Constipation since last Monday.  No relief with OTC medication, specifically Miralax     Patient is a 89 y/o female with no pertinent PMHx presents to the ED for constipation beginning 9/2. Patient's daughter at bedside to aid in hx. Patient reports intermittent abdominal pain. She denies nausea or vomiting. She reports having constipation before. She reports trying Mirelax with no relief. She reports it has been a while since her last ED visit for constipation. She reports her PCP recommended a teaspoon of coffee and a capful of mirelax as needed. She reports being afraid of too much mirelax due to diarrhea. She denies using pain medication.    Patient's PCP is Dr. Se Patel.    The history is provided by the patient and medical records. No  was used.     Review of patient's allergies indicates:  No Known Allergies  History reviewed. No pertinent past medical history.  Past Surgical History:   Procedure Laterality Date    EGD, WITH CLOSED BIOPSY  3/13/2023    Procedure: EGD, WITH CLOSED BIOPSY;  Surgeon: Anuel Calix MD;  Location: [facility] ENDOSCOPY;  Service: Gastroenterology;;    ESOPHAGOGASTRODUODENOSCOPY N/A 3/13/2023    Procedure: EGD;  Surgeon: Anuel Calix MD;  Location: [facility] ENDOSCOPY;  Service: Gastroenterology;  Laterality: N/A;     No family history on file.  Social History     Tobacco Use    Smoking status: Never    Smokeless tobacco: Never     Review of Systems   Constitutional:  Negative for fatigue, fever and unexpected weight change.   HENT:  Negative for congestion and rhinorrhea.    Eyes:  Negative for pain.   Respiratory:  Negative for chest tightness, shortness of  breath and wheezing.    Cardiovascular:  Negative for chest pain.   Gastrointestinal:  Positive for constipation. Negative for abdominal pain, diarrhea, nausea and vomiting.   Genitourinary:  Negative for dysuria.   Musculoskeletal:  Negative for back pain and neck pain.   Skin:  Negative for rash.   Allergic/Immunologic: Negative for environmental allergies, food allergies and immunocompromised state.   Neurological:  Negative for dizziness and speech difficulty.   Hematological:  Does not bruise/bleed easily.   Psychiatric/Behavioral:  Negative for sleep disturbance and suicidal ideas.        Physical Exam     Initial Vitals [09/08/24 1009]   BP Pulse Resp Temp SpO2   (!) 175/78 77 20 97.6 °F (36.4 °C) 99 %      MAP       --         Physical Exam    Nursing note and vitals reviewed.  Constitutional: She appears well-developed and well-nourished.   HENT:   Head: Normocephalic and atraumatic.   Mouth/Throat: Oropharynx is clear and moist.   Eyes: Conjunctivae are normal. Pupils are equal, round, and reactive to light.   Neck: Neck supple.   Normal range of motion.  Cardiovascular:  Normal rate, regular rhythm and normal heart sounds.           Pulmonary/Chest: Breath sounds normal. She has no wheezes. She has no rhonchi. She has no rales.   Abdominal: Abdomen is soft. Bowel sounds are normal.   Musculoskeletal:         General: Normal range of motion.      Cervical back: Normal range of motion and neck supple.     Neurological: She is alert and oriented to person, place, and time. GCS score is 15. GCS eye subscore is 4. GCS verbal subscore is 5. GCS motor subscore is 6.   Skin: Skin is warm and dry. Capillary refill takes less than 2 seconds.   Psychiatric: She has a normal mood and affect. Her behavior is normal. Judgment and thought content normal.         ED Course   Procedures  Labs Reviewed   URINALYSIS, REFLEX TO URINE CULTURE - Abnormal       Result Value    Color, UA Light-Yellow      Appearance, UA Clear       Specific Gravity, UA 1.018      pH, UA 5.5      Protein, UA Negative      Glucose, UA Normal      Ketones, UA Negative      Blood, UA Negative      Bilirubin, UA Negative      Urobilinogen, UA Normal      Nitrites, UA Negative      Leukocyte Esterase, UA Negative      RBC, UA 0-5      WBC, UA 0-5      Bacteria, UA None Seen      Squamous Epithelial Cells, UA Trace      Mucous, UA Trace (*)     Hyaline Casts, UA 6-10 (*)    COMPREHENSIVE METABOLIC PANEL - Abnormal    Sodium 142      Potassium 5.1      Chloride 111      CO2 23      Glucose 93      Blood Urea Nitrogen 27.2 (*)     Creatinine 1.20 (*)     Calcium 9.3      Protein Total 7.0      Albumin 3.7      Globulin 3.3      Albumin/Globulin Ratio 1.1      Bilirubin Total 0.5      ALP 89      ALT 16      AST 25      eGFR 42      Anion Gap 8.0      BUN/Creatinine Ratio 23     CBC WITH DIFFERENTIAL - Abnormal    WBC 5.68      RBC 3.77 (*)     Hgb 11.0 (*)     Hct 34.2 (*)     MCV 90.7      MCH 29.2      MCHC 32.2 (*)     RDW 13.2      Platelet 217      MPV 10.2      Neut % 53.2      Lymph % 31.3      Mono % 10.7      Eos % 3.2      Basophil % 1.2      Lymph # 1.78      Neut # 3.02      Mono # 0.61      Eos # 0.18      Baso # 0.07      IG# 0.02      IG% 0.4      NRBC% 0.0     CBC W/ AUTO DIFFERENTIAL    Narrative:     The following orders were created for panel order CBC auto differential.  Procedure                               Abnormality         Status                     ---------                               -----------         ------                     CBC with Differential[[id number]]        Abnormal            Final result                 Please view results for these tests on the individual orders.          Imaging Results              X-Ray Abdomen Flat And Erect (Final result)  Result time 09/08/24 11:05:14      Final result by Meg Rosario MD (09/08/24 11:05:14)                   Impression:      Mild to moderate stool  dillon.      Electronically signed by: Meg Rosario  Date:    09/08/2024  Time:    11:05               Narrative:    EXAMINATION:  XR ABDOMEN FLAT AND ERECT    CLINICAL HISTORY:  Constipation, unspecified    TECHNIQUE:  Supine and upright views of the abdomen performed.    COMPARISON:  None.    FINDINGS:  No dilated bowel loops. No evidence of obstruction.Mild to moderate stool burden.    No evidence of free intraperitoneal air.    No appreciable acute osseous abnormality.                                       Medications - No data to display  Medical Decision Making  The differential diagnosis includes, but is not limited to, obstipation and constipation.    Amount and/or Complexity of Data Reviewed  Labs: ordered.  Radiology: ordered.    Risk  OTC drugs.            Scribe Attestation:   Scribe #1: I performed the above scribed service and the documentation accurately describes the services I performed. I attest to the accuracy of the note.    Attending Attestation:           Physician Attestation for Scribe:  Physician Attestation Statement for Scribe #1: I, Sunday Benoit III, MD, reviewed documentation, as scribed by Alisha Gallagher in my presence, and it is both accurate and complete.                                    Clinical Impression:  Final diagnoses:  [K59.00] Constipation  [K59.00] Constipation, unspecified constipation type (Primary)          ED Disposition Condition    Discharge Stable          ED Prescriptions       Medication Sig Dispense Start Date End Date Auth. Provider    polyethylene glycol (GLYCOLAX) 17 gram/dose powder Take 17 g by mouth once daily. for 2 days 34 g 9/8/2024 9/10/2024 Sunday Benoit III, MD          Follow-up Information       Follow up With Specialties Details Why Contact Info    Se Patel MD Family Medicine In 2 days  [address]  [facility] [address]  [phone]               Sunday Benoit III, MD  09/08/24 0706

## 2024-09-08 NOTE — FIRST PROVIDER EVALUATION
"Medical screening examination initiated.  I have conducted a focused provider triage encounter, findings are as follows:    Brief history of present illness:  89 female presents to ED for evaluation of constipation for the past 6 days. Complains of rectal pain. Tried miralax OTC without relief. Denies any fever, nausea or vomiting.     Vitals:    09/08/24 1009 09/08/24 1029   BP: (!) 175/78 (!) 190/93   Pulse: 77 75   Resp: 20    Temp: 97.6 °F (36.4 °C)    SpO2: 99% 100%   Weight: 57.6 kg (127 lb)    Height: 5' 3" (1.6 m)        Pertinent physical exam:  Patient is awake and alert and oriented.  Ambulatory to triage.  In no acute distress.      Brief workup plan:  imaging    Preliminary workup initiated; this workup will be continued and followed by the physician or advanced practice provider that is assigned to the patient when roomed.  "

## 2025-06-25 ENCOUNTER — LAB VISIT (OUTPATIENT)
Dept: LAB | Facility: HOSPITAL | Age: OVER 89
End: 2025-06-25
Attending: INTERNAL MEDICINE
Payer: MEDICARE

## 2025-06-25 DIAGNOSIS — I10 ESSENTIAL HYPERTENSION, MALIGNANT: ICD-10-CM

## 2025-06-25 DIAGNOSIS — M19.90 SENILE ARTHRITIS: ICD-10-CM

## 2025-06-25 DIAGNOSIS — I25.10 CORONARY ATHEROSCLEROSIS OF NATIVE CORONARY ARTERY: ICD-10-CM

## 2025-06-25 DIAGNOSIS — K21.9 GASTROESOPHAGEAL REFLUX DISEASE, UNSPECIFIED WHETHER ESOPHAGITIS PRESENT: ICD-10-CM

## 2025-06-25 DIAGNOSIS — R94.31 NONSPECIFIC ABNORMAL ELECTROCARDIOGRAM (ECG) (EKG): Primary | ICD-10-CM

## 2025-06-25 DIAGNOSIS — I21.4 ACUTE MYOCARDIAL INFARCTION, SUBENDOCARDIAL INFARCTION, INITIAL EPISODE OF CARE: ICD-10-CM

## 2025-06-25 DIAGNOSIS — I20.89 ANGINA DECUBITUS: ICD-10-CM

## 2025-06-25 LAB
ALBUMIN SERPL-MCNC: 3.8 G/DL (ref 3.4–4.8)
ALBUMIN/GLOB SERPL: 1.2 RATIO (ref 1.1–2)
ALP SERPL-CCNC: 85 UNIT/L (ref 40–150)
ALT SERPL-CCNC: 17 UNIT/L (ref 0–55)
ANION GAP SERPL CALC-SCNC: 5 MEQ/L
AST SERPL-CCNC: 28 UNIT/L (ref 11–45)
BILIRUB SERPL-MCNC: 0.8 MG/DL
BUN SERPL-MCNC: 29.1 MG/DL (ref 9.8–20.1)
CALCIUM SERPL-MCNC: 8.9 MG/DL (ref 8.4–10.2)
CHLORIDE SERPL-SCNC: 114 MMOL/L (ref 98–111)
CHOLEST SERPL-MCNC: 125 MG/DL
CHOLEST/HDLC SERPL: 3 {RATIO} (ref 0–5)
CO2 SERPL-SCNC: 24 MMOL/L (ref 23–31)
CREAT SERPL-MCNC: 1.35 MG/DL (ref 0.55–1.02)
CREAT/UREA NIT SERPL: 22
ERYTHROCYTE [DISTWIDTH] IN BLOOD BY AUTOMATED COUNT: 13.1 % (ref 11.5–17)
EST. AVERAGE GLUCOSE BLD GHB EST-MCNC: 114 MG/DL
GFR SERPLBLD CREATININE-BSD FMLA CKD-EPI: 36 ML/MIN/1.73/M2
GLOBULIN SER-MCNC: 3.3 GM/DL (ref 2.4–3.5)
GLUCOSE SERPL-MCNC: 92 MG/DL (ref 75–121)
HBA1C MFR BLD: 5.6 %
HCT VFR BLD AUTO: 34.1 % (ref 37–47)
HDLC SERPL-MCNC: 46 MG/DL (ref 35–60)
HGB BLD-MCNC: 10.4 G/DL (ref 12–16)
LDLC SERPL CALC-MCNC: 56 MG/DL (ref 50–140)
MCH RBC QN AUTO: 29 PG (ref 27–31)
MCHC RBC AUTO-ENTMCNC: 30.5 G/DL (ref 33–36)
MCV RBC AUTO: 95 FL (ref 80–94)
NRBC BLD AUTO-RTO: 0 %
PLATELET # BLD AUTO: 197 X10(3)/MCL (ref 130–400)
PMV BLD AUTO: 10.4 FL (ref 7.4–10.4)
POTASSIUM SERPL-SCNC: 5 MMOL/L (ref 3.5–5.1)
PROT SERPL-MCNC: 7.1 GM/DL (ref 5.8–7.6)
RBC # BLD AUTO: 3.59 X10(6)/MCL (ref 4.2–5.4)
SODIUM SERPL-SCNC: 143 MMOL/L (ref 136–145)
TRIGL SERPL-MCNC: 113 MG/DL (ref 37–140)
TSH SERPL-ACNC: 2.23 UIU/ML (ref 0.35–4.94)
VLDLC SERPL CALC-MCNC: 23 MG/DL
WBC # BLD AUTO: 5.91 X10(3)/MCL (ref 4.5–11.5)

## 2025-06-25 PROCEDURE — 84443 ASSAY THYROID STIM HORMONE: CPT

## 2025-06-25 PROCEDURE — 36415 COLL VENOUS BLD VENIPUNCTURE: CPT

## 2025-06-25 PROCEDURE — 83036 HEMOGLOBIN GLYCOSYLATED A1C: CPT | Mod: GA

## 2025-06-25 PROCEDURE — 80061 LIPID PANEL: CPT

## 2025-06-25 PROCEDURE — 80053 COMPREHEN METABOLIC PANEL: CPT

## 2025-06-25 PROCEDURE — 85027 COMPLETE CBC AUTOMATED: CPT

## (undated) DEVICE — SOL IRRI STRL WATER 1000ML

## (undated) DEVICE — KIT SURGICAL COLON .25 1.1OZ

## (undated) DEVICE — CONTAINER SPECIMEN SCREW 4OZ

## (undated) DEVICE — KIT CANIST SUCTION 1200CC

## (undated) DEVICE — BAG LABGUARD BIOHAZARD 6X9IN

## (undated) DEVICE — TIP SUCTION YANKAUER

## (undated) DEVICE — FORCEP ALLIGATOR 2.8MM W/NDL

## (undated) DEVICE — COLLECTION SPECIMEN NEPTUNE

## (undated) DEVICE — FORCEP BX LG CAP 2.8MMX240CM

## (undated) DEVICE — TUBING O2 FEMALE CONN 13FT